# Patient Record
Sex: FEMALE | Race: WHITE | Employment: OTHER | URBAN - METROPOLITAN AREA
[De-identification: names, ages, dates, MRNs, and addresses within clinical notes are randomized per-mention and may not be internally consistent; named-entity substitution may affect disease eponyms.]

---

## 2017-09-25 ENCOUNTER — TELEPHONE (OUTPATIENT)
Dept: ENDOCRINOLOGY | Age: 77
End: 2017-09-25

## 2020-03-09 ENCOUNTER — TELEPHONE (OUTPATIENT)
Dept: INTERNAL MEDICINE CLINIC | Age: 80
End: 2020-03-09

## 2020-03-25 ENCOUNTER — TELEPHONE (OUTPATIENT)
Dept: INTERNAL MEDICINE CLINIC | Age: 80
End: 2020-03-25

## 2020-03-26 ENCOUNTER — OFFICE VISIT (OUTPATIENT)
Dept: INTERNAL MEDICINE CLINIC | Age: 80
End: 2020-03-26
Payer: MEDICARE

## 2020-03-26 ENCOUNTER — TELEPHONE (OUTPATIENT)
Dept: INTERNAL MEDICINE CLINIC | Age: 80
End: 2020-03-26

## 2020-03-26 VITALS
HEART RATE: 65 BPM | WEIGHT: 162 LBS | BODY MASS INDEX: 28.7 KG/M2 | OXYGEN SATURATION: 100 % | SYSTOLIC BLOOD PRESSURE: 137 MMHG | HEIGHT: 63 IN | DIASTOLIC BLOOD PRESSURE: 71 MMHG

## 2020-03-26 PROBLEM — I34.1 MVP (MITRAL VALVE PROLAPSE): Status: ACTIVE | Noted: 2020-03-26

## 2020-03-26 PROBLEM — L71.9 ROSACEA: Status: ACTIVE | Noted: 2020-03-26

## 2020-03-26 PROBLEM — Z86.718 HISTORY OF DEEP VEIN THROMBOSIS OF LOWER EXTREMITY: Status: ACTIVE | Noted: 2017-06-08

## 2020-03-26 PROBLEM — R35.0 INCREASED FREQUENCY OF URINATION: Status: ACTIVE | Noted: 2020-01-10

## 2020-03-26 PROBLEM — N32.81 OAB (OVERACTIVE BLADDER): Status: ACTIVE | Noted: 2019-09-12

## 2020-03-26 PROBLEM — C64.1 CARCINOMA, RENAL CELL, RIGHT (HCC): Status: ACTIVE | Noted: 2020-03-26

## 2020-03-26 PROBLEM — I83.891 VARICOSE VEINS OF LEG WITH SWELLING, RIGHT: Status: ACTIVE | Noted: 2020-03-26

## 2020-03-26 PROBLEM — Z86.39 HISTORY OF THYROID NODULE: Status: ACTIVE | Noted: 2020-03-26

## 2020-03-26 PROBLEM — M81.0 OSTEOPOROSIS: Status: ACTIVE | Noted: 2020-03-26

## 2020-03-26 PROBLEM — N83.202 LEFT OVARIAN CYST: Status: ACTIVE | Noted: 2020-03-26

## 2020-03-26 PROBLEM — R79.89 ELEVATED LFTS: Status: ACTIVE | Noted: 2020-03-26

## 2020-03-26 PROBLEM — G90.529 REFLEX SYMPATHETIC DYSTROPHY OF LOWER LIMB: Status: ACTIVE | Noted: 2017-04-05

## 2020-03-26 PROBLEM — I10 ESSENTIAL HYPERTENSION: Status: ACTIVE | Noted: 2017-02-13

## 2020-03-26 PROBLEM — Z96.652 STATUS POST TOTAL LEFT KNEE REPLACEMENT: Status: ACTIVE | Noted: 2017-03-01

## 2020-03-26 PROCEDURE — 99204 OFFICE O/P NEW MOD 45 MIN: CPT | Performed by: INTERNAL MEDICINE

## 2020-03-26 PROCEDURE — 1123F ACP DISCUSS/DSCN MKR DOCD: CPT | Performed by: INTERNAL MEDICINE

## 2020-03-26 PROCEDURE — G8417 CALC BMI ABV UP PARAM F/U: HCPCS | Performed by: INTERNAL MEDICINE

## 2020-03-26 PROCEDURE — G8399 PT W/DXA RESULTS DOCUMENT: HCPCS | Performed by: INTERNAL MEDICINE

## 2020-03-26 PROCEDURE — 1090F PRES/ABSN URINE INCON ASSESS: CPT | Performed by: INTERNAL MEDICINE

## 2020-03-26 PROCEDURE — 1036F TOBACCO NON-USER: CPT | Performed by: INTERNAL MEDICINE

## 2020-03-26 PROCEDURE — 4040F PNEUMOC VAC/ADMIN/RCVD: CPT | Performed by: INTERNAL MEDICINE

## 2020-03-26 PROCEDURE — G8484 FLU IMMUNIZE NO ADMIN: HCPCS | Performed by: INTERNAL MEDICINE

## 2020-03-26 PROCEDURE — G8427 DOCREV CUR MEDS BY ELIG CLIN: HCPCS | Performed by: INTERNAL MEDICINE

## 2020-03-26 RX ORDER — FOLIC ACID-PYRIDOXINE-CYANOCOBALAMIN TAB 2.5-25-2 MG 2.5-25-2 MG
1 TAB ORAL DAILY
Qty: 90 TABLET | Refills: 1 | Status: SHIPPED | OUTPATIENT
Start: 2020-03-26 | End: 2020-10-05

## 2020-03-26 RX ORDER — ATORVASTATIN CALCIUM 40 MG/1
40 TABLET, FILM COATED ORAL DAILY
Qty: 90 TABLET | Refills: 1 | Status: SHIPPED | OUTPATIENT
Start: 2020-03-26 | End: 2020-09-21

## 2020-03-26 RX ORDER — ATORVASTATIN CALCIUM 40 MG/1
40 TABLET, FILM COATED ORAL DAILY
COMMUNITY
End: 2020-03-26 | Stop reason: SDUPTHER

## 2020-03-26 RX ORDER — HYDROCORTISONE ACETATE 0.5 %
1 CREAM (GRAM) TOPICAL DAILY
COMMUNITY

## 2020-03-26 RX ORDER — LORATADINE 10 MG/1
10 CAPSULE, LIQUID FILLED ORAL DAILY
COMMUNITY
End: 2021-01-26 | Stop reason: ALTCHOICE

## 2020-03-26 RX ORDER — HYDROCHLOROTHIAZIDE 12.5 MG/1
12.5 CAPSULE, GELATIN COATED ORAL DAILY
Qty: 90 CAPSULE | Refills: 1 | Status: CANCELLED | OUTPATIENT
Start: 2020-03-26

## 2020-03-26 RX ORDER — AMLODIPINE BESYLATE 2.5 MG/1
2.5 TABLET ORAL DAILY
COMMUNITY
End: 2020-03-26 | Stop reason: SDUPTHER

## 2020-03-26 RX ORDER — AMLODIPINE BESYLATE 2.5 MG/1
2.5 TABLET ORAL DAILY
Qty: 90 TABLET | Refills: 1 | Status: SHIPPED | OUTPATIENT
Start: 2020-03-26 | End: 2020-09-21

## 2020-03-26 RX ORDER — CHLORAL HYDRATE 500 MG
1000 CAPSULE ORAL DAILY
COMMUNITY
Start: 2004-04-20 | End: 2021-04-26

## 2020-03-26 RX ORDER — FOLIC ACID-PYRIDOXINE-CYANOCOBALAMIN TAB 2.5-25-2 MG 2.5-25-2 MG
1 TAB ORAL DAILY
COMMUNITY
End: 2020-03-26 | Stop reason: SDUPTHER

## 2020-03-26 RX ORDER — HYDROCHLOROTHIAZIDE 12.5 MG/1
12.5 CAPSULE, GELATIN COATED ORAL DAILY
COMMUNITY
End: 2020-06-12

## 2020-03-26 SDOH — HEALTH STABILITY: MENTAL HEALTH: HOW OFTEN DO YOU HAVE A DRINK CONTAINING ALCOHOL?: MONTHLY OR LESS

## 2020-03-26 ASSESSMENT — ENCOUNTER SYMPTOMS
CONSTIPATION: 0
COLOR CHANGE: 0
SINUS PAIN: 0
NAUSEA: 0
SINUS PRESSURE: 0
EYE PAIN: 0
TROUBLE SWALLOWING: 0
WHEEZING: 0
SHORTNESS OF BREATH: 0
DIARRHEA: 0
COUGH: 0
PHOTOPHOBIA: 0
ABDOMINAL PAIN: 0
BACK PAIN: 0
VOMITING: 0

## 2020-03-26 ASSESSMENT — PATIENT HEALTH QUESTIONNAIRE - PHQ9
1. LITTLE INTEREST OR PLEASURE IN DOING THINGS: 0
SUM OF ALL RESPONSES TO PHQ9 QUESTIONS 1 & 2: 0
2. FEELING DOWN, DEPRESSED OR HOPELESS: 0
SUM OF ALL RESPONSES TO PHQ QUESTIONS 1-9: 0
SUM OF ALL RESPONSES TO PHQ QUESTIONS 1-9: 0

## 2020-03-26 NOTE — PROGRESS NOTES
extra 10 lbs over the last year. History of thyroid nodule - had been stable. Never biopsied. Osteopenia  Last DEXA 2016  HISTORY:  Menopause    COMPARISON:  None. REGION ASSESSED:    L Spine (L1-L4):  BMD = 1.068 g/cm2, T-score = 0.2, Z-score = 2.7. Left hip:  BMD = 0.883 g/cm2, T-score = -0.5, Z-score = 1.4. Femoral Neck:  BMD = 0.704 g/cm2, T-score = -1.3, Z-score = 0.8. Right hip:  BMD = 0.884 g/cm2, T-score = -0.5, Z-score = 1.4. Femoral Neck:  BMD = 0.723 g/cm2, T-score = -1.1, Z-score = 1.0. FRAX REPORT (WHO 10 Year Fracture Risk Assesment):    FRAX score is 11% major osteoporotic fracture and 2.1% hip fracture. Review of Systems   Constitutional: Negative for appetite change, fatigue, fever and unexpected weight change. No night sweats   HENT: Negative for congestion, ear pain, hearing loss, nosebleeds, sinus pressure, sinus pain, sneezing, tinnitus and trouble swallowing. Eyes: Negative for photophobia, pain and visual disturbance. Respiratory: Negative for cough, shortness of breath and wheezing. Cardiovascular: Negative for chest pain, palpitations and leg swelling. Gastrointestinal: Negative for abdominal pain, constipation, diarrhea, nausea and vomiting. Endocrine: Negative for cold intolerance, heat intolerance, polydipsia, polyphagia and polyuria. Genitourinary: Negative for difficulty urinating, dysuria, frequency, hematuria and urgency. Musculoskeletal: Negative for arthralgias, back pain, joint swelling, myalgias and neck pain. Skin: Negative for color change and rash. Allergic/Immunologic: Negative for environmental allergies, food allergies and immunocompromised state. Neurological: Negative for dizziness, syncope, speech difficulty, weakness, light-headedness, numbness and headaches. Hematological: Negative for adenopathy. Does not bruise/bleed easily. Psychiatric/Behavioral: Negative for dysphoric mood and sleep disturbance.  The patient is not nervous/anxious. Prior to Visit Medications    Medication Sig Taking? Authorizing Provider   atorvastatin (LIPITOR) 40 MG tablet Take 40 mg by mouth daily Yes Historical Provider, MD   hydrochlorothiazide (MICROZIDE) 12.5 MG capsule Take 12.5 mg by mouth daily Yes Historical Provider, MD   amLODIPine (NORVASC) 2.5 MG tablet Take 2.5 mg by mouth daily Yes Historical Provider, MD   folic acid-pyridoxine-cyancobalamin (FOLBIC) 2.5-25-2 MG TABS Take 1 tablet by mouth daily Yes Historical Provider, MD   Omega-3 Fatty Acids (FISH OIL) 1000 MG CAPS Take 1,000 mg by mouth daily Yes Historical Provider, MD   Glucosamine-Chondroit-Vit C-Mn (GLUCOSAMINE 1500 COMPLEX) CAPS Take 1 tablet by mouth daily Yes Historical Provider, MD   Glucosamine-Chondroit-Vit C-Mn (GLUCOSAMINE 1500 COMPLEX PO) Take 1,500 mg by mouth daily Yes Historical Provider, MD   loratadine (CLARITIN) 10 MG capsule Take 10 mg by mouth daily Yes Historical Provider, MD   aspirin 1 MG/ML SUSP Take 81 mg by mouth daily Yes Historical Provider, MD        Allergies   Allergen Reactions    Atenolol      Avoid beta blockers; sino atrial node dysfunction Dr Garland Pena 12/2013    Sulfa Antibiotics Nausea And Vomiting     Dizziness    Lisinopril Swelling    Meperidine Nausea And Vomiting     Lightheadness       Past Medical History:   Diagnosis Date    DVT (deep venous thrombosis) ([facility] [address] 2017    after left TKR       No past surgical history on file.     Social History     Socioeconomic History    Marital status:      Spouse name: Not on file    Number of children: Not on file    Years of education: Not on file    Highest education level: Not on file   Occupational History    Not on file   Social Needs    Financial resource strain: Not on file    Food insecurity     Worry: Not on file     Inability: Not on file    Transportation needs     Medical: Not on file     Non-medical: Not on file   Tobacco Use    Smoking status: Former Smoker Years: 15.00     Types: Cigarettes    Smokeless tobacco: Former User    Tobacco comment: stop smoking 1980   Substance and Sexual Activity    Alcohol use: Yes     Alcohol/week: 1.0 standard drinks     Types: 1 Glasses of wine per week     Frequency: Monthly or less    Drug use: Never    Sexual activity: Not Currently   Lifestyle    Physical activity     Days per week: Not on file     Minutes per session: Not on file    Stress: Not on file   Relationships    Social connections     Talks on phone: Not on file     Gets together: Not on file     Attends Bahai service: Not on file     Active member of club or organization: Not on file     Attends meetings of clubs or organizations: Not on file     Relationship status: Not on file    Intimate partner violence     Fear of current or ex partner: Not on file     Emotionally abused: Not on file     Physically abused: Not on file     Forced sexual activity: Not on file   Other Topics Concern    Not on file   Social History Narrative    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 Family History   Problem Relation Age of Onset    Breast Cancer Daughter 44       Vitals:    03/26/20 1023   BP: 137/71   Pulse: 65   SpO2: 100%   Weight: 162 lb (73.5 kg)   Height: 5' 3\" (1.6 m)     Estimated body mass index is 28.7 kg/m² as calculated from the following:    Height as of this encounter: 5' 3\" (1.6 m). Weight as of this encounter: 162 lb (73.5 kg). Physical Exam  Constitutional:       General: She is not in acute distress. Appearance: Normal appearance. She is well-developed. She is not diaphoretic. HENT:      Head: Normocephalic and atraumatic. Right Ear: Tympanic membrane, ear canal and external ear normal.      Left Ear: Tympanic membrane, ear canal and external ear normal.      Nose: Nose normal.      Mouth/Throat:      Mouth: Mucous membranes are moist.      Pharynx: Oropharynx is clear.  No oropharyngeal exudate or posterior oropharyngeal Differential; Future    Left ovarian cyst  As far as I know this is new. Relatively small, cystic. Will get pelvic u/s (has been 2 months) and . May need gyn referral.   -     CBC Auto Differential; Future  -     ; Future  -     US PELVIS COMPLETE; Future    Carcinoma, renal cell, right (HCC)  -     CBC Auto Differential; Future    History of thyroid nodule  Will check TSH    Encounter for screening mammogram for breast cancer  -     CEE DIGITAL SCREEN W OR WO CAD BILATERAL; Future    Postmenopausal  -     DEXA BONE DENSITY AXIAL SKELETON; Future  -     Vitamin D 25 Hydroxy; Future    Osteopenia of multiple sites  -     DEXA BONE DENSITY AXIAL SKELETON; Future  -     Vitamin D 25 Hydroxy; Future    Varicose veins of leg with swelling, right  Stable    Rosacea  Stable follows with derm    Vitamin D deficiency  -     Vitamin D 25 Hydroxy; Future    Mixed hyperlipidemia  Due for assessment. Lifestyle modifications. Atorvastatin 40mg daily. -     atorvastatin (LIPITOR) 40 MG tablet; Take 1 tablet by mouth daily  -     Comprehensive Metabolic Panel; Future  -     Lipid Panel; Future  -     CBC Auto Differential; Future    Thyroid nodule  -     TSH with Reflex; Future    Other intra-abdominal and pelvic swelling, mass and lump   -     ; Future    Other orders  -     folic acid-pyridoxine-cyancobalamin (FOLBIC) 2.5-25-2 MG TABS; Take 1 tablet by mouth daily          Return in about 4 months (around 7/26/2020) for chronic conditions. An  electronic signature was used to authenticate this note.     --Gladys Randle MD on 3/26/2020 at 11:04 AM

## 2020-03-27 ENCOUNTER — HOSPITAL ENCOUNTER (OUTPATIENT)
Dept: ULTRASOUND IMAGING | Age: 80
Discharge: HOME | End: 2020-03-27
Payer: MEDICARE

## 2020-03-27 PROCEDURE — 76830 TRANSVAGINAL US NON-OB: CPT

## 2020-03-27 PROCEDURE — 76856 US EXAM PELVIC COMPLETE: CPT

## 2020-07-27 ENCOUNTER — OFFICE VISIT (OUTPATIENT)
Dept: INTERNAL MEDICINE CLINIC | Age: 80
End: 2020-07-27
Payer: MEDICARE

## 2020-07-27 VITALS
DIASTOLIC BLOOD PRESSURE: 62 MMHG | OXYGEN SATURATION: 99 % | TEMPERATURE: 99.2 F | HEART RATE: 60 BPM | RESPIRATION RATE: 14 BRPM | SYSTOLIC BLOOD PRESSURE: 138 MMHG

## 2020-07-27 PROCEDURE — G8399 PT W/DXA RESULTS DOCUMENT: HCPCS | Performed by: INTERNAL MEDICINE

## 2020-07-27 PROCEDURE — 4040F PNEUMOC VAC/ADMIN/RCVD: CPT | Performed by: INTERNAL MEDICINE

## 2020-07-27 PROCEDURE — 99214 OFFICE O/P EST MOD 30 MIN: CPT | Performed by: INTERNAL MEDICINE

## 2020-07-27 PROCEDURE — 1123F ACP DISCUSS/DSCN MKR DOCD: CPT | Performed by: INTERNAL MEDICINE

## 2020-07-27 PROCEDURE — 1036F TOBACCO NON-USER: CPT | Performed by: INTERNAL MEDICINE

## 2020-07-27 PROCEDURE — 1090F PRES/ABSN URINE INCON ASSESS: CPT | Performed by: INTERNAL MEDICINE

## 2020-07-27 PROCEDURE — G8417 CALC BMI ABV UP PARAM F/U: HCPCS | Performed by: INTERNAL MEDICINE

## 2020-07-27 PROCEDURE — G8427 DOCREV CUR MEDS BY ELIG CLIN: HCPCS | Performed by: INTERNAL MEDICINE

## 2020-07-27 NOTE — PROGRESS NOTES
[address]  Primary Care  Internal Medicine  Follow Up  Megan Salas MD      SUBJECTIVE:  Marisol Ocampo is a 78 y. o.female    Chief Complaint   Patient presents with    Hyperlipidemia     Here for routine follow up. Had her 6 month follow up for right RCC at [address] [place] [address] and disease was stable. 7/2020 CT a/p with contrast  IMPRESSION:    1. Enhancing lesion inferior pole right kidney present previously most consistent with renal cell carcinoma without invasion of the structures of the renal sinus or adjacent structures. Hyperdense lesion inferior pole left kidney without significant enhancement likely relates to hyperdense cyst. Continued follow-up recommended. Tiny retroperitoneal lymph nodes by size criteria likely reactive. Small hiatal hernia. There were previously noted left ovarian cysts which were not commented on in this study. Seen on ultrasound in March and 6 month follow up recommended. 3/27/20 pelvic u/s  Pelvic ultrasound         Transabdominal and transvaginal exam         HISTORY: Left ovarian cyst.         COMPARISON: Pelvic ultrasound December 27, 2012.         Uterus has been removed.         The right ovary measures 21 x 11 x 15 mm. A suspected simple cyst is identified in the right ovary which measures 11 x 10 x 9 mm.         Within the left adnexa there are 2 adjacent cysts without definite ovarian tissue measuring 19 x 18 x 20 mm with possible thin septation versus adjacent separate cyst. A second simple cyst is identified which measures 28 x 23 x 24 mm. Findings are    indeterminate and follow-up in 6 months is recommended. HTN - tolerating amlodipine 2.5mg daily and HCTZ 12.5mg daily without issue. Trying to stay active. HLD - taking atorvastatin 40mg daily without new myalgias or GI side effects. Past Medical History:   Diagnosis Date    DVT (deep venous thrombosis) ([address] 2017    after left TKR       History reviewed.  No pertinent surgical history. Family History   Problem Relation Age of Onset    Deep Vein Thrombosis Mother 26    Breast Cancer Daughter 44       Social History     Socioeconomic History    Marital status:      Spouse name: Ivanna Valdez Number of children: Not on file    Years of education: Not on file    Highest education level: Not on file   Occupational History    Not on file   Social Needs    Financial resource strain: Not on file    Food insecurity     Worry: Not on file     Inability: Not on file   [employer] needs     Medical: Not on file     Non-medical: Not on file   Tobacco Use    Smoking status: Former Smoker     Years: 15.00     Types: Cigarettes     Last attempt to quit: 1980     Years since quittin.6    Smokeless tobacco: Former User    Tobacco comment: stop smoking    Substance and Sexual Activity    Alcohol use: Yes     Alcohol/week: 1.0 standard drinks     Types: 1 Glasses of wine per week     Frequency: Monthly or less    Drug use: Never    Sexual activity: Not Currently   Lifestyle    Physical activity     Days per week: Not on file     Minutes per session: Not on file    Stress: Not on file   Relationships    Social connections     Talks on phone: Not on file     Gets together: Not on file     Attends Buddhism service: Not on file     Active member of club or organization: Not on file     Attends meetings of clubs or organizations: Not on file     Relationship status: Not on file    Intimate partner violence     Fear of current or ex partner: Not on file     Emotionally abused: Not on file     Physically abused: Not on file     Forced sexual activity: Not on file   Other Topics Concern    Not on file   Social History Narrative    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 Multiple grandchildren. Review of Systems   Constitutional: Negative for chills, fatigue and fever.    Respiratory: Negative for cough, shortness of breath and wheezing. Cardiovascular: Negative for chest pain, palpitations and leg swelling. Gastrointestinal: Negative for abdominal distention, abdominal pain, constipation, diarrhea, nausea and vomiting. Genitourinary: Negative for difficulty urinating, frequency and urgency. Musculoskeletal: Negative for arthralgias. Skin: Negative for rash. Current Outpatient Medications on File Prior to Visit   Medication Sig Dispense Refill    Loratadine (CLARITIN PO) Take 10 mg by mouth as needed      hydroCHLOROthiazide (MICROZIDE) 12.5 MG capsule TAKE 1 CAPSULE BY MOUTH DAILY 90 capsule 1    folic acid-pyridoxine-cyancobalamin (FOLBIC) 2.5-25-2 MG TABS Take 1 tablet by mouth daily 90 tablet 1    atorvastatin (LIPITOR) 40 MG tablet Take 1 tablet by mouth daily 90 tablet 1    amLODIPine (NORVASC) 2.5 MG tablet Take 1 tablet by mouth daily 90 tablet 1    Omega-3 Fatty Acids (FISH OIL) 1000 MG CAPS Take 1,000 mg by mouth daily      Glucosamine-Chondroit-Vit C-Mn (GLUCOSAMINE 1500 COMPLEX) CAPS Take 1 tablet by mouth daily      aspirin 1 MG/ML SUSP Take 81 mg by mouth daily      loratadine (CLARITIN) 10 MG capsule Take 10 mg by mouth daily       No current facility-administered medications on file prior to visit. OBJECTIVE:  /62   Pulse 60   Temp 99.2 °F (37.3 °C) (Temporal)   Resp 14   SpO2 99%   Wt Readings from Last 3 Encounters:   03/26/20 162 lb (73.5 kg)     There is no height or weight on file to calculate BMI. BP Readings from Last 3 Encounters:   07/27/20 138/62   03/26/20 137/71     Pulse Readings from Last 3 Encounters:   07/27/20 60   03/26/20 65       Physical Exam  Constitutional:       General: She is not in acute distress. Appearance: Normal appearance. She is not diaphoretic. HENT:      Head: Normocephalic and atraumatic.       Right Ear: External ear normal.      Left Ear: External ear normal.      Nose: Nose normal.      Mouth/Throat:      Mouth: Mucous membranes are moist.      Pharynx: Oropharynx is clear. Eyes:      General: No scleral icterus. Conjunctiva/sclera: Conjunctivae normal.   Neck:      Musculoskeletal: Normal range of motion. Cardiovascular:      Rate and Rhythm: Normal rate and regular rhythm. Pulses: Normal pulses. Heart sounds: Normal heart sounds. No murmur. No friction rub. No gallop. Pulmonary:      Effort: Pulmonary effort is normal.      Breath sounds: Normal breath sounds. No wheezing, rhonchi or rales. Abdominal:      General: Abdomen is flat. Bowel sounds are normal.   Musculoskeletal:         General: No deformity. Right lower leg: No edema. Left lower leg: No edema. Skin:     General: Skin is warm and dry. Findings: No rash. Neurological:      General: No focal deficit present. Mental Status: She is alert. Coordination: Coordination normal.      Gait: Gait normal.   Psychiatric:         Mood and Affect: Mood normal.         Behavior: Behavior normal.           Available labs reviewed    ASSESSMENT/PLAN:    Abel Stafford was seen today for hyperlipidemia. Diagnoses and all orders for this visit:    Left ovarian cyst  Need to document stability. left adnexa there are 2 adjacent cysts without definite ovarian tissue measuring 19 x 18 x 20 mm with possible thin septation versus adjacent separate cyst. A second simple cyst is identified which measures 28 x 23 x 24 mm. Will get repeat pelvic u/s.   -     US NON OB TRANSVAGINAL; Future    Carcinoma, renal cell, right (HCC)  Stable. Following at [facility] Essential hypertension  BP at goal today. No medication changes. She would like it to be lower. Discussed sodium restriction and increasing activity levels. Consider increasing HCTZ dose in future if needed. Mixed hyperlipidemia  Not due for assessment. Continue atorvastatin 40mg daily. Return in about 6 months (around 1/27/2021) for AWV + chronic.

## 2020-08-05 ASSESSMENT — ENCOUNTER SYMPTOMS
VOMITING: 0
DIARRHEA: 0
SHORTNESS OF BREATH: 0
COUGH: 0
NAUSEA: 0
ABDOMINAL PAIN: 0
ABDOMINAL DISTENTION: 0
CONSTIPATION: 0
WHEEZING: 0

## 2020-09-21 RX ORDER — ATORVASTATIN CALCIUM 40 MG/1
40 TABLET, FILM COATED ORAL DAILY
Qty: 90 TABLET | Refills: 1 | Status: SHIPPED | OUTPATIENT
Start: 2020-09-21 | End: 2021-03-15

## 2020-09-21 RX ORDER — AMLODIPINE BESYLATE 2.5 MG/1
2.5 TABLET ORAL DAILY
Qty: 90 TABLET | Refills: 1 | Status: SHIPPED | OUTPATIENT
Start: 2020-09-21 | End: 2021-03-02

## 2020-10-07 ENCOUNTER — HOSPITAL ENCOUNTER (OUTPATIENT)
Dept: ULTRASOUND IMAGING | Age: 80
Discharge: HOME | End: 2020-10-07
Payer: MEDICARE

## 2020-10-07 PROCEDURE — 76830 TRANSVAGINAL US NON-OB: CPT

## 2020-10-07 PROCEDURE — 76856 US EXAM PELVIC COMPLETE: CPT

## 2020-12-17 RX ORDER — HYDROCHLOROTHIAZIDE 12.5 MG/1
12.5 CAPSULE, GELATIN COATED ORAL EVERY MORNING
Qty: 90 CAPSULE | Refills: 1 | Status: SHIPPED | OUTPATIENT
Start: 2020-12-17 | End: 2020-12-28 | Stop reason: SDUPTHER

## 2020-12-28 RX ORDER — HYDROCHLOROTHIAZIDE 12.5 MG/1
12.5 CAPSULE, GELATIN COATED ORAL EVERY MORNING
Qty: 90 CAPSULE | Refills: 1 | Status: SHIPPED | OUTPATIENT
Start: 2020-12-28 | End: 2021-04-26 | Stop reason: SINTOL

## 2020-12-28 NOTE — TELEPHONE ENCOUNTER
On 12/17/2020, refill went to [facility]. Needed to be [facility]. Patient out of meds.      Rx sent to correct pharmacy

## 2021-01-26 ENCOUNTER — TELEPHONE (OUTPATIENT)
Dept: VASCULAR SURGERY | Age: 81
End: 2021-01-26

## 2021-01-26 ENCOUNTER — OFFICE VISIT (OUTPATIENT)
Dept: INTERNAL MEDICINE CLINIC | Age: 81
End: 2021-01-26
Payer: MEDICARE

## 2021-01-26 VITALS
HEIGHT: 63 IN | WEIGHT: 158.6 LBS | RESPIRATION RATE: 12 BRPM | DIASTOLIC BLOOD PRESSURE: 68 MMHG | OXYGEN SATURATION: 98 % | TEMPERATURE: 97.6 F | HEART RATE: 68 BPM | SYSTOLIC BLOOD PRESSURE: 160 MMHG | BODY MASS INDEX: 28.1 KG/M2

## 2021-01-26 DIAGNOSIS — C64.1 CARCINOMA, RENAL CELL, RIGHT (HCC): ICD-10-CM

## 2021-01-26 DIAGNOSIS — I83.813 VARICOSE VEINS OF BILATERAL LOWER EXTREMITIES WITH PAIN: ICD-10-CM

## 2021-01-26 DIAGNOSIS — I10 ESSENTIAL HYPERTENSION: ICD-10-CM

## 2021-01-26 DIAGNOSIS — E78.2 MIXED HYPERLIPIDEMIA: ICD-10-CM

## 2021-01-26 DIAGNOSIS — E55.9 VITAMIN D DEFICIENCY: ICD-10-CM

## 2021-01-26 DIAGNOSIS — Z00.00 ROUTINE GENERAL MEDICAL EXAMINATION AT A HEALTH CARE FACILITY: Primary | ICD-10-CM

## 2021-01-26 DIAGNOSIS — I35.8 AORTIC VALVE SCLEROSIS: ICD-10-CM

## 2021-01-26 DIAGNOSIS — I34.0 NON-RHEUMATIC MITRAL REGURGITATION: ICD-10-CM

## 2021-01-26 DIAGNOSIS — Z13.6 ENCOUNTER FOR SCREENING FOR CARDIOVASCULAR DISORDERS: ICD-10-CM

## 2021-01-26 PROCEDURE — 93000 ELECTROCARDIOGRAM COMPLETE: CPT | Performed by: INTERNAL MEDICINE

## 2021-01-26 PROCEDURE — G8399 PT W/DXA RESULTS DOCUMENT: HCPCS | Performed by: INTERNAL MEDICINE

## 2021-01-26 PROCEDURE — 1036F TOBACCO NON-USER: CPT | Performed by: INTERNAL MEDICINE

## 2021-01-26 PROCEDURE — G8417 CALC BMI ABV UP PARAM F/U: HCPCS | Performed by: INTERNAL MEDICINE

## 2021-01-26 PROCEDURE — 1123F ACP DISCUSS/DSCN MKR DOCD: CPT | Performed by: INTERNAL MEDICINE

## 2021-01-26 PROCEDURE — 99214 OFFICE O/P EST MOD 30 MIN: CPT | Performed by: INTERNAL MEDICINE

## 2021-01-26 PROCEDURE — 4040F PNEUMOC VAC/ADMIN/RCVD: CPT | Performed by: INTERNAL MEDICINE

## 2021-01-26 PROCEDURE — 1090F PRES/ABSN URINE INCON ASSESS: CPT | Performed by: INTERNAL MEDICINE

## 2021-01-26 PROCEDURE — G0438 PPPS, INITIAL VISIT: HCPCS | Performed by: INTERNAL MEDICINE

## 2021-01-26 PROCEDURE — G8427 DOCREV CUR MEDS BY ELIG CLIN: HCPCS | Performed by: INTERNAL MEDICINE

## 2021-01-26 PROCEDURE — G8482 FLU IMMUNIZE ORDER/ADMIN: HCPCS | Performed by: INTERNAL MEDICINE

## 2021-01-26 ASSESSMENT — PATIENT HEALTH QUESTIONNAIRE - PHQ9
SUM OF ALL RESPONSES TO PHQ9 QUESTIONS 1 & 2: 0
SUM OF ALL RESPONSES TO PHQ QUESTIONS 1-9: 0
SUM OF ALL RESPONSES TO PHQ QUESTIONS 1-9: 0

## 2021-01-26 ASSESSMENT — LIFESTYLE VARIABLES
HOW OFTEN DO YOU HAVE SIX OR MORE DRINKS ON ONE OCCASION: 0
AUDIT-C TOTAL SCORE: 2

## 2021-01-26 NOTE — PROGRESS NOTES
Medicare Annual Wellness Visit  Name: Atiya Lee Date: 2021   MRN: [id number] Sex: Female   Age: [de-identified] y.o. Ethnicity: Non-/Non    : 1940 Race: Dina Banks is here for Medicare AWV    Also wants a cardiac work up. Going to [place] over the summer on an active trip with her 13year old grandson and has fear   She is taking her ASA/statin. BP has not been well controlled - variable at home. Does check. Occasionally sees 346 systolic but usually lower. Not as active as she had been. Has some varicose veins that bother her right more than left. Has had knee surgery on the left with history of dvt after surgery. They are achy after activity. Would liek to see vascular. Screenings for behavioral, psychosocial and functional/safety risks, and cognitive dysfunction are all negative except as indicated below. These results, as well as other patient data from the [address] form, are documented in Flowsheets linked to this Encounter. Allergies   Allergen Reactions    Atenolol      Avoid beta blockers; sino atrial node dysfunction Dr Alonso Gottron 2013    Sulfa Antibiotics Nausea And Vomiting     Dizziness    Lisinopril Swelling    Meperidine Nausea And Vomiting     Lightheadness       Prior to Visit Medications    Medication Sig Taking?  Authorizing Provider   hydroCHLOROthiazide (MICROZIDE) 12.5 MG capsule Take 1 capsule by mouth every morning Yes MD VIDAL DixonBIC 2.5-25-2 MG TABS TAKE ONE TABLET BY MOUTH DAILY Yes Mich Briseno MD   atorvastatin (LIPITOR) 40 MG tablet TAKE 1 TABLET BY MOUTH DAILY Yes Mich Briseno MD   amLODIPine (NORVASC) 2.5 MG tablet TAKE 1 TABLET BY MOUTH DAILY Yes Mich Briseno MD   Loratadine (CLARITIN PO) Take 10 mg by mouth as needed Yes Historical Provider, MD   Omega-3 Fatty Acids (FISH OIL) 1000 MG CAPS Take 1,000 mg by mouth daily Yes Historical Provider, MD   Glucosamine-Chondroit-Vit C-Mn (GLUCOSAMINE 1500 COMPLEX) CAPS Take 1 tablet by mouth daily Yes Historical Provider, MD   aspirin 1 MG/ML SUSP Take 81 mg by mouth daily Yes Historical Provider, MD       Past Medical History:   Diagnosis Date    DVT (deep venous thrombosis) ([address] 2017    after left TKR       History reviewed. No pertinent surgical history. Family History   Problem Relation Age of Onset    Deep Vein Thrombosis Mother 35    Breast Cancer Daughter 44       CareTeam (Including outside providers/suppliers regularly involved in providing care):   Patient Care Team:  Mayuri Lion MD as PCP - General (Internal Medicine)  Mayuri Lion MD as PCP - [facility] Empaneled Provider  Shalonda Callaway MD as Consulting Physician (Urology)    Wt Readings from Last 3 Encounters:   01/26/21 158 lb 9.6 oz (71.9 kg)   03/26/20 162 lb (73.5 kg)     Vitals:    01/26/21 1007   BP: (!) 160/68   Pulse: 68   Resp: 12   Temp: 97.6 °F (36.4 °C)   TempSrc: Temporal   SpO2: 98%   Weight: 158 lb 9.6 oz (71.9 kg)   Height: 5' 3\" (1.6 m)     Body mass index is 28.09 kg/m². Based upon direct observation of the patient, evaluation of cognition reveals recent and remote memory intact. Physical Exam  Constitutional:       General: She is not in acute distress. Appearance: Normal appearance. She is not diaphoretic. HENT:      Head: Normocephalic and atraumatic. Right Ear: External ear normal.      Left Ear: External ear normal.      Nose: Nose normal.      Mouth/Throat:      Pharynx: Oropharynx is clear. Eyes:      General: No scleral icterus. Conjunctiva/sclera: Conjunctivae normal.   Neck:      Musculoskeletal: Normal range of motion. Cardiovascular:      Rate and Rhythm: Normal rate and regular rhythm. Pulses: Normal pulses. Heart sounds: Normal heart sounds. No murmur. No friction rub. No gallop. Comments: Varicose veins right greater than left.  No ulceration or inflammation noted  Pulmonary:      Effort: Pulmonary effort is normal.      Breath sounds: Normal breath sounds. No wheezing, rhonchi or rales. Abdominal:      General: Abdomen is flat. Bowel sounds are normal.   Musculoskeletal:         General: No deformity. Right lower leg: No edema. Left lower leg: No edema. Skin:     General: Skin is warm and dry. Findings: No rash. Neurological:      General: No focal deficit present. Mental Status: She is alert. Coordination: Coordination normal.      Gait: Gait normal.   Psychiatric:         Mood and Affect: Mood normal.         Behavior: Behavior normal.           Patient's complete Health Risk Assessment and screening values have been reviewed and are found in Flowsheets. The following problems were reviewed today and where indicated follow up appointments were made and/or referrals ordered. Positive Risk Factor Screenings with Interventions:               No Positive Risk Factors identified today.     Personalized Preventive Plan   Current Health Maintenance Status  Immunization History   Administered Date(s) Administered    COVID-19, Moderna, 100mcg/0.5ml 01/19/2021    Hepatitis A Ped/Adol (Havrix, Vaqta) 11/20/2009, 05/25/2010    Influenza Virus Vaccine 10/01/2019    Influenza, High Dose (Fluzone 65 yrs and older) 09/08/2020    Pneumococcal Conjugate 13-valent ([id number]) 11/23/2014    Pneumococcal Polysaccharide ([id number]) 02/01/2005, 12/19/2012    Td (Adult), 5 Lf Tetanus Toxoid, Pf (Tenivac, Decavac) 11/01/2009    Tdap (Boostrix, Adacel) 07/31/2020    Typhoid Vi capsular polysaccharide (Typhim VI) 11/02/2009    Yellow Fever (YF-Vax) 11/20/2009    Zoster Live (Zostavax) 04/01/2007    Zoster Recombinant (Shingrix) 08/01/2018, 10/03/2018, 10/24/2018        Health Maintenance   Topic Date Due    Annual Wellness Visit (AWV)  03/13/2020    COVID-19 Vaccine (2 of 2 - Moderna series) 02/16/2021    Lipid screen  03/26/2021    Potassium monitoring  03/26/2021    Creatinine monitoring  03/26/2021    DTaP/Tdap/Td vaccine (2 - Td) 07/31/2030    DEXA (modify frequency per FRAX score)  Completed    Flu vaccine  Completed    Shingles Vaccine  Completed    Pneumococcal 65+ years Vaccine  Completed    Hepatitis A vaccine  Aged Out    Hepatitis B vaccine  Aged Out    Hib vaccine  Aged Out    Meningococcal (ACWY) vaccine  Aged Out     Recommendations for [employer] Due: see orders and patient instructions/AVS.  . Recommended screening schedule for the next 5-10 years is provided to the patient in written form: see Patient Castro Gross was seen today for medicare awv. Diagnoses and all orders for this visit:    Routine general medical examination at a health care facility  Personalized Preventive Plan is provided to patient in written form- see Patient Instructions    Aortic valve sclerosis  -     ECHO Complete 2D W Doppler W Color; Future  -     EKG 12 Lead    Non-rheumatic mitral regurgitation  Monitor her MR with TTE. Has been 3 years. No progression of symptoms. EKG today unchanged from prior. TWI I, aVL unchanged from prior. Had ETT in 2019 which was normal.   -     ECHO Complete 2D W Doppler W Color; Future  -     EKG 12 Lead    Essential hypertension  Monitor BP at home over next few weeks. Consider increase amlodipine to 5mg daily. Check labs. Encourage lifestyle modifications. -     EKG 12 Lead  -     Comprehensive Metabolic Panel; Future  -     CBC Auto Differential; Future  -     TSH with Reflex; Future  -     C-REACTIVE PROTEIN HIGH SENSITIVITY; Future    Vitamin D deficiency  - Vitamin D levels. Mixed hyperlipidemia  Continue statin. Due for assessment. Encouraged lifestyle modifications. -     Lipid Panel; Future  -     Comprehensive Metabolic Panel; Future  -     CBC Auto Differential; Future  -     C-REACTIVE PROTEIN HIGH SENSITIVITY;  Future    Encounter for screening for cardiovascular disorders  -     C-REACTIVE PROTEIN HIGH SENSITIVITY; Future    Varicose veins of bilateral lower extremities with pain  -     Naima Magdaleno MD, Vascular Surgery, [facility]         RCC right - CT due this summer for monitoring at [facility].

## 2021-01-26 NOTE — PATIENT INSTRUCTIONS
Personalized Preventive Plan for Shira Mcnamara - 1/26/2021  Medicare offers a range of preventive health benefits. Some of the tests and screenings are paid in full while other may be subject to a deductible, co-insurance, and/or copay. Some of these benefits include a comprehensive review of your medical history including lifestyle, illnesses that may run in your family, and various assessments and screenings as appropriate. After reviewing your medical record and screening and assessments performed today your provider may have ordered immunizations, labs, imaging, and/or referrals for you. A list of these orders (if applicable) as well as your Preventive Care list are included within your After Visit Summary for your review. Other Preventive Recommendations:    · A preventive eye exam performed by an eye specialist is recommended every 1-2 years to screen for glaucoma; cataracts, macular degeneration, and other eye disorders. · A preventive dental visit is recommended every 6 months. · Try to get at least 150 minutes of exercise per week or 10,000 steps per day on a pedometer . · Order or download the FREE \"Exercise & Physical Activity: Your Everyday Guide\" from [employer]. Call 0-645.323.6221 or search [employer] online. · You need 9956-0174 mg of calcium and 4553-0264 IU of vitamin D per day. It is possible to meet your calcium requirement with diet alone, but a vitamin D supplement is usually necessary to meet this goal.  · When exposed to the sun, use a sunscreen that protects against both UVA and UVB radiation with an SPF of 30 or greater. Reapply every 2 to 3 hours or after sweating, drying off with a towel, or swimming. · Always wear a seat belt when traveling in a car. Always wear a helmet when riding a bicycle or motorcycle.

## 2021-01-26 NOTE — TELEPHONE ENCOUNTER
New patient scheduled for 2/9/21    Patient is being seen for I83.813 (ICD-10-CM) - Varicose veins of bilateral lower extremities with pain

## 2021-01-27 DIAGNOSIS — I83.819 VARICOSE VEINS WITH PAIN: Primary | ICD-10-CM

## 2021-01-29 ENCOUNTER — HOSPITAL ENCOUNTER (OUTPATIENT)
Dept: NON INVASIVE DIAGNOSTICS | Age: 81
Discharge: HOME | End: 2021-01-29
Payer: MEDICARE

## 2021-01-29 ENCOUNTER — HOSPITAL ENCOUNTER (OUTPATIENT)
Dept: VASCULAR LAB | Age: 81
Discharge: HOME | End: 2021-01-29
Payer: MEDICARE

## 2021-01-29 DIAGNOSIS — I35.8 AORTIC VALVE SCLEROSIS: ICD-10-CM

## 2021-01-29 DIAGNOSIS — I34.0 NON-RHEUMATIC MITRAL REGURGITATION: ICD-10-CM

## 2021-01-29 DIAGNOSIS — E78.2 MIXED HYPERLIPIDEMIA: ICD-10-CM

## 2021-01-29 DIAGNOSIS — I83.819 VARICOSE VEINS WITH PAIN: ICD-10-CM

## 2021-01-29 DIAGNOSIS — I10 ESSENTIAL HYPERTENSION: ICD-10-CM

## 2021-01-29 DIAGNOSIS — Z13.6 ENCOUNTER FOR SCREENING FOR CARDIOVASCULAR DISORDERS: ICD-10-CM

## 2021-01-29 DIAGNOSIS — E55.9 VITAMIN D DEFICIENCY: ICD-10-CM

## 2021-01-29 LAB
A/G RATIO: 1.7 (ref 1.1–2.2)
ALBUMIN SERPL-MCNC: 4.3 G/DL (ref 3.4–5)
ALP BLD-CCNC: 71 U/L (ref 40–129)
ALT SERPL-CCNC: 24 U/L (ref 10–40)
ANION GAP SERPL CALCULATED.3IONS-SCNC: 9 MMOL/L (ref 3–16)
AST SERPL-CCNC: 28 U/L (ref 15–37)
BASOPHILS ABSOLUTE: 0.1 K/UL (ref 0–0.2)
BASOPHILS RELATIVE PERCENT: 0.7 %
BILIRUB SERPL-MCNC: 0.7 MG/DL (ref 0–1)
BUN BLDV-MCNC: 19 MG/DL (ref 7–20)
C-REACTIVE PROTEIN, HIGH SENSITIVITY: 0.88 MG/L (ref 0.16–3)
CALCIUM SERPL-MCNC: 9.1 MG/DL (ref 8.3–10.6)
CHLORIDE BLD-SCNC: 104 MMOL/L (ref 99–110)
CHOLESTEROL, TOTAL: 144 MG/DL (ref 0–199)
CO2: 27 MMOL/L (ref 21–32)
CREAT SERPL-MCNC: 0.6 MG/DL (ref 0.6–1.2)
EOSINOPHILS ABSOLUTE: 0.1 K/UL (ref 0–0.6)
EOSINOPHILS RELATIVE PERCENT: 0.9 %
GFR AFRICAN AMERICAN: >60
GFR NON-AFRICAN AMERICAN: >60
GLOBULIN: 2.5 G/DL
GLUCOSE BLD-MCNC: 97 MG/DL (ref 70–99)
HCT VFR BLD CALC: 43.3 % (ref 36–48)
HDLC SERPL-MCNC: 58 MG/DL (ref 40–60)
HEMOGLOBIN: 14.1 G/DL (ref 12–16)
LDL CHOLESTEROL CALCULATED: 71 MG/DL
LV EF: 63 %
LVEF MODALITY: NORMAL
LYMPHOCYTES ABSOLUTE: 2.1 K/UL (ref 1–5.1)
LYMPHOCYTES RELATIVE PERCENT: 26.4 %
MCH RBC QN AUTO: 29.1 PG (ref 26–34)
MCHC RBC AUTO-ENTMCNC: 32.6 G/DL (ref 31–36)
MCV RBC AUTO: 89.2 FL (ref 80–100)
MONOCYTES ABSOLUTE: 0.6 K/UL (ref 0–1.3)
MONOCYTES RELATIVE PERCENT: 7.8 %
NEUTROPHILS ABSOLUTE: 5 K/UL (ref 1.7–7.7)
NEUTROPHILS RELATIVE PERCENT: 64.2 %
PDW BLD-RTO: 13.6 % (ref 12.4–15.4)
PLATELET # BLD: 271 K/UL (ref 135–450)
PMV BLD AUTO: 8.8 FL (ref 5–10.5)
POTASSIUM SERPL-SCNC: 3.8 MMOL/L (ref 3.5–5.1)
RBC # BLD: 4.85 M/UL (ref 4–5.2)
SODIUM BLD-SCNC: 140 MMOL/L (ref 136–145)
TOTAL PROTEIN: 6.8 G/DL (ref 6.4–8.2)
TRIGL SERPL-MCNC: 75 MG/DL (ref 0–150)
TSH REFLEX: 2.2 UIU/ML (ref 0.27–4.2)
VITAMIN D 25-HYDROXY: 45.9 NG/ML
VLDLC SERPL CALC-MCNC: 15 MG/DL
WBC # BLD: 7.8 K/UL (ref 4–11)

## 2021-01-29 PROCEDURE — 6360000004 HC RX CONTRAST MEDICATION: Performed by: INTERNAL MEDICINE

## 2021-01-29 PROCEDURE — 93970 EXTREMITY STUDY: CPT

## 2021-01-29 PROCEDURE — C8929 TTE W OR WO FOL WCON,DOPPLER: HCPCS

## 2021-01-29 RX ADMIN — PERFLUTREN 2.2 MG: 6.52 INJECTION, SUSPENSION INTRAVENOUS at 15:31

## 2021-02-23 ENCOUNTER — OFFICE VISIT (OUTPATIENT)
Dept: VASCULAR SURGERY | Age: 81
End: 2021-02-23
Payer: MEDICARE

## 2021-02-23 VITALS
BODY MASS INDEX: 27.64 KG/M2 | HEIGHT: 63 IN | HEART RATE: 70 BPM | TEMPERATURE: 97.3 F | SYSTOLIC BLOOD PRESSURE: 138 MMHG | DIASTOLIC BLOOD PRESSURE: 74 MMHG | WEIGHT: 156 LBS

## 2021-02-23 DIAGNOSIS — M79.671 RIGHT FOOT PAIN: Primary | ICD-10-CM

## 2021-02-23 PROCEDURE — G8399 PT W/DXA RESULTS DOCUMENT: HCPCS | Performed by: SURGERY

## 2021-02-23 PROCEDURE — 1090F PRES/ABSN URINE INCON ASSESS: CPT | Performed by: SURGERY

## 2021-02-23 PROCEDURE — 4040F PNEUMOC VAC/ADMIN/RCVD: CPT | Performed by: SURGERY

## 2021-02-23 PROCEDURE — G8482 FLU IMMUNIZE ORDER/ADMIN: HCPCS | Performed by: SURGERY

## 2021-02-23 PROCEDURE — 1123F ACP DISCUSS/DSCN MKR DOCD: CPT | Performed by: SURGERY

## 2021-02-23 PROCEDURE — 1036F TOBACCO NON-USER: CPT | Performed by: SURGERY

## 2021-02-23 PROCEDURE — 99203 OFFICE O/P NEW LOW 30 MIN: CPT | Performed by: SURGERY

## 2021-02-23 PROCEDURE — G8417 CALC BMI ABV UP PARAM F/U: HCPCS | Performed by: SURGERY

## 2021-02-23 PROCEDURE — G8427 DOCREV CUR MEDS BY ELIG CLIN: HCPCS | Performed by: SURGERY

## 2021-02-23 NOTE — PROGRESS NOTES
[facility] Vascular Surgery  Taya Mckay MD, AREND[place], [address]    OUTPATIENT CONSULTATION    Date of Consultation:  2/23/2021    PCP:  Fam Shields MD       Chief Complaint: Right leg varicose veins    History of Present Illness: Orlando Ku is a very nice and healthy/active-appearing [de-identified] y.o. female who presents today for progressively worsening varicose veins of the bilateral lower extremities, right worse than left. She has had these for years, but these have become more noticeable and she is having pain at times at the sites. She does wear light compression, 15 to 20 mmHg knee-high's. She denies significant swelling. Also, significantly, she complains of pain in the right lateral ankle which is improved when she applies pressure. She does a lot of walking, hiking. She is concerned that this will continue to worsen and keep her from her daily activities. 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 She underwent the following study, images which I personally reviewed:  VL Reflux Vinayak Insufficiency Stdy Bilat  1/29/2021  Summary        No evidence of deep or superficial venous thrombosis bilateral legs.        Reflux right GSV and anterior accessory SV.        Reflux left GSV as described above. Past Medical History:  Past Medical History:   Diagnosis Date    DVT (deep venous thrombosis) ([address] 2017    after left TKR     Past Surgical History:  No past surgical history on file. Home Medications:   Prior to Admission medications    Medication Sig Start Date End Date Taking?  Authorizing Provider   hydroCHLOROthiazide (MICROZIDE) 12.5 MG capsule Take 1 capsule by mouth every morning 12/28/20   Wendy Martinez MD   FOLBIC 2.5-25-2 MG TABS TAKE ONE TABLET BY MOUTH DAILY 10/5/20   Wendy Martinez MD   atorvastatin (LIPITOR) 40 MG tablet TAKE 1 TABLET BY MOUTH DAILY 9/21/20   Wendy Martinez MD amLODIPine (NORVASC) 2.5 MG tablet TAKE 1 TABLET BY MOUTH DAILY 20   Caio Shaffer MD   Loratadine (CLARITIN PO) Take 10 mg by mouth as needed    Historical Provider, MD   Omega-3 Fatty Acids (FISH OIL) 1000 MG CAPS Take 1,000 mg by mouth daily 04   Historical Provider, MD   Glucosamine-Chondroit-Vit C-Mn (GLUCOSAMINE 1500 COMPLEX) CAPS Take 1 tablet by mouth daily    Historical Provider, MD   aspirin 1 MG/ML SUSP Take 81 mg by mouth daily    Historical Provider, MD      Allergies:  Atenolol, Sulfa antibiotics, Lisinopril, and Meperidine     Social History:    Social History     Socioeconomic History    Marital status:      Spouse name: Cinthia Fabry Number of children: Not on file    Years of education: Not on file    Highest education level: Not on file   Occupational History    Not on file   Social Needs    Financial resource strain: Not on file    Food insecurity     Worry: Not on file     Inability: Not on file   [employer] needs     Medical: Not on file     Non-medical: Not on file   Tobacco Use    Smoking status: Former Smoker     Years: 15.00     Types: Cigarettes     Quit date: 1980     Years since quittin.1    Smokeless tobacco: Former User    Tobacco comment: stop smoking    Substance and Sexual Activity    Alcohol use:  Yes     Alcohol/week: 1.0 standard drinks     Types: 1 Glasses of wine per week     Frequency: Monthly or less    Drug use: Never    Sexual activity: Not Currently   Lifestyle    Physical activity     Days per week: Not on file     Minutes per session: Not on file    Stress: Not on file   Relationships    Social connections     Talks on phone: Not on file     Gets together: Not on file     Attends Congregational service: Not on file     Active member of club or organization: Not on file     Attends meetings of clubs or organizations: Not on file     Relationship status: Not on file    Intimate partner violence     Fear of current or ex partner: Not on file     Emotionally abused: Not on file     Physically abused: Not on file     Forced sexual activity: Not on file   Other Topics Concern    Not on file   Social History Narrative    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 Multiple grandchildren. Family History:      Problem Relation Age of Onset    Deep Vein Thrombosis Mother 35    Breast Cancer Daughter 44     Review of Systems:  Pertinent positive and negative items are noted in the HPI. A comprehensive review of all other symptoms is otherwise negative. PhysicalExamination:    Vitals:    02/23/21 0950   Temp: 97.3 °F (36.3 °C)   TempSrc: Temporal   Weight: 156 lb (70.8 kg)   Height: 5' 3\" (1.6 m)     Body mass index is 27.63 kg/m². Physical Exam  Constitutional:       General: She is not in acute distress. Appearance: Normal appearance. She is normal weight. She is not ill-appearing. Comments: Appears younger than stated age. Quite healthy-appearing. Cardiovascular:      Rate and Rhythm: Normal rate and regular rhythm. Heart sounds: No murmur. No friction rub. No gallop. Pulmonary:      Effort: Pulmonary effort is normal. No respiratory distress. Breath sounds: Normal breath sounds. No stridor. No wheezing, rhonchi or rales. Musculoskeletal:        Legs:    Skin:     General: Skin is warm and dry. Capillary Refill: Capillary refill takes less than 2 seconds. Coloration: Skin is not jaundiced. Findings: No rash. Neurological:      General: No focal deficit present. Mental Status: She is alert and oriented to person, place, and time. Cranial Nerves: No cranial nerve deficit. Psychiatric:         Mood and Affect: Mood normal.         Behavior: Behavior normal.         Thought Content: Thought content normal.         Judgment: Judgment normal.      Pulses:    DP PT   RIGHT 2 2   LEFT 2 2     Diagnosis:     1.   Bilateral lower extremity varicose veins with pain. --Recommend compression stockings, higher compression, 20 to 30 mmHg knee-high's   --Re-evaluate symptoms in 4 weeks  2. Right lateral ankle pain   --Check MRI foot. She may also benefit from physical therapy to the foot. It seems musculoskeletal in nature. Further recommendations when she returns for follow up.      Plan:

## 2021-03-03 ENCOUNTER — OFFICE VISIT (OUTPATIENT)
Dept: INTERNAL MEDICINE CLINIC | Age: 81
End: 2021-03-03

## 2021-03-03 ENCOUNTER — TELEPHONE (OUTPATIENT)
Dept: INTERNAL MEDICINE CLINIC | Age: 81
End: 2021-03-03

## 2021-03-03 VITALS
OXYGEN SATURATION: 97 % | SYSTOLIC BLOOD PRESSURE: 138 MMHG | HEART RATE: 62 BPM | BODY MASS INDEX: 27.32 KG/M2 | RESPIRATION RATE: 12 BRPM | WEIGHT: 154.2 LBS | DIASTOLIC BLOOD PRESSURE: 70 MMHG

## 2021-03-03 DIAGNOSIS — Z01.818 PRE-OP EVALUATION: Primary | ICD-10-CM

## 2021-03-03 NOTE — PROGRESS NOTES
[facility]  Preoperative Evaluation  Hakeem Romo DO  Internal Medicine      French Grayson  YOB: 1940    Date of Service:  3/3/2021    Chief Complaint   Patient presents with    Pre-op Exam     [employer] Bladder stimulator 3/22/21 Dr Judit Duke @ [facility]        HPI:    This patient presents tothe office today for a preoperative evaluation at the request of surgeon, Dr. Judit Duke, who plans on performing bladder stimulator on  at [facility].  The current problem began over the years secondary to multiple surgeries, and symptoms have been stable with time. Conservative therapy: N/A. Planned anesthesia: MAC  Known anesthesia problems: None   Bleeding risk: No recent or remote history of abnormal bleeding  Personal or FH of DVT/PE: Yes - had a DVT after total knee surgery about 3 years ago. Mother  from DVT/PE following surgery.   Recent steroid use: no  Exercise tolerance before surgery at least 4 METS (Can walk up a flight of steps or a hill or walk on level ground at 3 to 4 mph): Yes   Patient objection to receiving blood products: No    Patient Active Problem List   Diagnosis    Essential hypertension    Elevated LFTs    Atrial premature beats    Aortic valve sclerosis    Family history of heart disease in male family member before age 54    Family history of sudden death   Joselyn Hopson History of deep vein thrombosis of lower extremity    Increased frequency of urination    Reflex sympathetic dystrophy of lower limb    Mixed hyperlipidemia    MVP (mitral valve prolapse)    Non-rheumatic mitral regurgitation    OAB (overactive bladder)    Osteoporosis    Status post total left knee replacement    Left ovarian cyst    Carcinoma, renal cell, right (HCC)    History of thyroid nodule    Varicose veins of leg with swelling, right    Rosacea       Review of Systems 14 - point ROS was reviewed and negative except for that noted above in the HPI    Allergies   Allergen Reactions    Atenolol      Avoid beta blockers; sino atrial node dysfunction Dr Jordon Blunt 2013    Sulfa Antibiotics Nausea And Vomiting     Dizziness    Lisinopril Swelling    Meperidine Nausea And Vomiting     Lightheadness     Outpatient Medications Marked as Taking for the 3/3/21 encounter (Office Visit) with Jered Bey, DO   Medication Sig Dispense Refill    amLODIPine (NORVASC) 5 MG tablet Take 1 tablet by mouth daily 90 tablet 1    hydroCHLOROthiazide (MICROZIDE) 12.5 MG capsule Take 1 capsule by mouth every morning 90 capsule 1    FOLBIC 2.5-25-2 MG TABS TAKE ONE TABLET BY MOUTH DAILY 90 tablet 3    atorvastatin (LIPITOR) 40 MG tablet TAKE 1 TABLET BY MOUTH DAILY 90 tablet 1    Loratadine (CLARITIN PO) Take 10 mg by mouth as needed      Omega-3 Fatty Acids (FISH OIL) 1000 MG CAPS Take 1,000 mg by mouth daily      Glucosamine-Chondroit-Vit C-Mn (GLUCOSAMINE 1500 COMPLEX) CAPS Take 1 tablet by mouth daily      aspirin 1 MG/ML SUSP Take 81 mg by mouth daily         Past Medical History:   Diagnosis Date    DVT (deep venous thrombosis) ([facility] [address] 2017    after left TKR     No past surgical history on file.   Family History   Problem Relation Age of Onset    Deep Vein Thrombosis Mother 35    Breast Cancer Daughter 44     Social History     Socioeconomic History    Marital status:      Spouse name: Olinda Tejeda Number of children: Not on file    Years of education: Not on file    Highest education level: Not on file   Occupational History    Not on file   Social Needs    Financial resource strain: Not on file    Food insecurity     Worry: Not on file     Inability: Not on file   [employer] needs     Medical: Not on file     Non-medical: Not on file   Tobacco Use    Smoking status: Former Smoker     Years: 15.00     Types: Cigarettes     Quit date: 1980     Years since quittin.1    Smokeless tobacco: Former User    Tobacco comment: stop smoking    Substance and Sexual Activity    Alcohol use: Yes     Alcohol/week: 1.0 standard drinks     Types: 1 Glasses of wine per week     Frequency: Monthly or less    Drug use: Never    Sexual activity: Not Currently   Lifestyle    Physical activity     Days per week: Not on file     Minutes per session: Not on file    Stress: Not on file   Relationships    Social connections     Talks on phone: Not on file     Gets together: Not on file     Attends Nondenominational service: Not on file     Active member of club or organization: Not on file     Attends meetings of clubs or organizations: Not on file     Relationship status: Not on file    Intimate partner violence     Fear of current or ex partner: Not on file     Emotionally abused: Not on file     Physically abused: Not on file     Forced sexual activity: Not on file   Other Topics Concern    Not on file   Social History Narrative    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 Multiple grandchildren. Objective:    Vitals:    03/03/21 0955   BP: 138/70   Pulse: 62   Resp: 12   SpO2: 97%   Weight: 154 lb 3.2 oz (69.9 kg)       Wt Readings from Last 2 Encounters:   03/03/21 154 lb 3.2 oz (69.9 kg)   02/23/21 156 lb (70.8 kg)     BP Readings from Last 3 Encounters:   03/03/21 138/70   02/23/21 138/74   01/26/21 (!) 160/68      Physical Exam  Constitutional:       General: She is not in acute distress. Appearance: Normal appearance. She is not ill-appearing. HENT:      Head: Normocephalic and atraumatic. Right Ear: External ear normal.      Left Ear: External ear normal.   Eyes:      General: No scleral icterus. Extraocular Movements: Extraocular movements intact. Conjunctiva/sclera: Conjunctivae normal.   Neck:      Musculoskeletal: Normal range of motion. No muscular tenderness. Cardiovascular:      Rate and Rhythm: Normal rate and regular rhythm. Pulses: Normal pulses. Heart sounds: No murmur. No friction rub.  No gallop. Pulmonary:      Effort: Pulmonary effort is normal. No respiratory distress. Breath sounds: Normal breath sounds. No wheezing, rhonchi or rales. Abdominal:      General: Bowel sounds are normal. There is no distension. Palpations: Abdomen is soft. There is no mass. Tenderness: There is no abdominal tenderness. There is no guarding or rebound. Musculoskeletal: Normal range of motion. General: No swelling or tenderness. Lymphadenopathy:      Cervical: No cervical adenopathy. Skin:     General: Skin is warm. Coloration: Skin is not jaundiced. Findings: No erythema or rash. Neurological:      General: No focal deficit present. Mental Status: She is alert and oriented to person, place, and time. Psychiatric:         Mood and Affect: Mood normal.         Behavior: Behavior normal.          Lab Results   Component Value Date    HDL 58 01/29/2021    LDLCALC 71 01/29/2021     No results found for: GLUF, LABA1C  Lab Results   Component Value Date     01/29/2021    K 3.8 01/29/2021    BUN 19 01/29/2021    CREATININE 0.6 01/29/2021    LABGLOM >60 01/29/2021    GFRAA >60 01/29/2021    CALCIUM 9.1 01/29/2021    VITD25 45.9 01/29/2021     Lab Results   Component Value Date    ALT 24 01/29/2021    AST 28 01/29/2021     Lab Results   Component Value Date    HGB 14.1 01/29/2021     Lab Results   Component Value Date    TSHREFLEX 2.20 01/29/2021    TSH 2.48 01/05/2011             Assessment/Plan:     1. Pre-op evaluation  Low risk for a low risk surgery. Okay to proceed with surgery.            Emergent procedure No  Active Cardiac Condition (decompensated HF, Arrhythmia, MI <3 weeks, severe valve disease):  No   Risk Level of Procedure Low Risk (endoscopy, superficial skin, breast, ambulatory, or cataract, etc.)  Revised Cardiac Risk Index Risk factors: None    Patient is low risk for major cardiac complications during a low risk procedure and may continue as planned. Preoperative workup as follows: none  Change in medication regimen before surgery: Hold all medications on morning of surgery, can take amlodipine day of if surgery is later in the day. Prophylaxis for cardiac events with perioperative beta-blockers:Not indicated    Deep vein thrombosis prophylaxis: regimen to be chosen by surgical team of note patient does have hx of prior DVT post op.     Further recommendations requested from consultants: No    --Chely MADDOX [address] [place] [address], DO on 3/3/2021 at 10:29 AM

## 2021-03-04 ENCOUNTER — TELEPHONE (OUTPATIENT)
Dept: OTHER | Age: 81
End: 2021-03-04

## 2021-03-04 DIAGNOSIS — I10 ESSENTIAL HYPERTENSION: ICD-10-CM

## 2021-03-04 RX ORDER — AMLODIPINE BESYLATE 5 MG/1
5 TABLET ORAL DAILY
Qty: 90 TABLET | Refills: 1 | Status: SHIPPED | OUTPATIENT
Start: 2021-03-04 | End: 2021-09-07

## 2021-03-04 NOTE — TELEPHONE ENCOUNTER
Patient states her amlodipine was called in to the wrong pharmacy. Please cancell at the [facility] pharmacy and call into   [place] [id number] [facility], [address] [place] [address] [phone]    Patient is requesting this be done today. Patient made aware to allow 24 to 48 business hours for prescription refills.

## 2021-03-06 ENCOUNTER — HOSPITAL ENCOUNTER (OUTPATIENT)
Dept: MRI IMAGING | Age: 81
Discharge: HOME | End: 2021-03-06
Payer: MEDICARE

## 2021-03-06 DIAGNOSIS — M79.671 RIGHT FOOT PAIN: ICD-10-CM

## 2021-03-06 PROCEDURE — A9579 GAD-BASE MR CONTRAST NOS,1ML: HCPCS | Performed by: SURGERY

## 2021-03-06 PROCEDURE — 73723 MRI JOINT LWR EXTR W/O&W/DYE: CPT

## 2021-03-06 PROCEDURE — 6360000004 HC RX CONTRAST MEDICATION: Performed by: SURGERY

## 2021-03-06 RX ADMIN — GADOTERIDOL 15 ML: 279.3 INJECTION, SOLUTION INTRAVENOUS at 11:14

## 2021-03-15 DIAGNOSIS — E78.2 MIXED HYPERLIPIDEMIA: ICD-10-CM

## 2021-03-15 RX ORDER — ATORVASTATIN CALCIUM 40 MG/1
40 TABLET, FILM COATED ORAL DAILY
Qty: 90 TABLET | Refills: 1 | Status: SHIPPED | OUTPATIENT
Start: 2021-03-15 | End: 2021-09-09

## 2021-03-23 ENCOUNTER — OFFICE VISIT (OUTPATIENT)
Dept: VASCULAR SURGERY | Age: 81
End: 2021-03-23
Payer: MEDICARE

## 2021-03-23 VITALS
SYSTOLIC BLOOD PRESSURE: 142 MMHG | DIASTOLIC BLOOD PRESSURE: 75 MMHG | HEIGHT: 63 IN | TEMPERATURE: 96.7 F | BODY MASS INDEX: 27.43 KG/M2 | WEIGHT: 154.8 LBS | HEART RATE: 64 BPM

## 2021-03-23 DIAGNOSIS — M79.671 FOOT PAIN, RIGHT: Primary | ICD-10-CM

## 2021-03-23 PROCEDURE — 99214 OFFICE O/P EST MOD 30 MIN: CPT | Performed by: SURGERY

## 2021-03-23 PROCEDURE — G8427 DOCREV CUR MEDS BY ELIG CLIN: HCPCS | Performed by: SURGERY

## 2021-03-23 PROCEDURE — G8399 PT W/DXA RESULTS DOCUMENT: HCPCS | Performed by: SURGERY

## 2021-03-23 PROCEDURE — G8482 FLU IMMUNIZE ORDER/ADMIN: HCPCS | Performed by: SURGERY

## 2021-03-23 PROCEDURE — 4040F PNEUMOC VAC/ADMIN/RCVD: CPT | Performed by: SURGERY

## 2021-03-23 PROCEDURE — G8417 CALC BMI ABV UP PARAM F/U: HCPCS | Performed by: SURGERY

## 2021-03-23 PROCEDURE — 1123F ACP DISCUSS/DSCN MKR DOCD: CPT | Performed by: SURGERY

## 2021-03-23 PROCEDURE — 1036F TOBACCO NON-USER: CPT | Performed by: SURGERY

## 2021-03-23 PROCEDURE — 1090F PRES/ABSN URINE INCON ASSESS: CPT | Performed by: SURGERY

## 2021-03-23 NOTE — PROGRESS NOTES
Yash Hicks (:  1940) is a [de-identified] y.o. female,Established patient, here for evaluation of the following chief complaint(s):  Follow-up (4 week follow up, MRI has been performed as of  3/6/21)      ASSESSMENT/PLAN:  1. Foot pain, right  -     Ambulatory referral to Podiatry   Dr. Chichi Ascencio Osvaldo--contact information provided    I believe that most of her foot pain is musculoskeletal.  I do not think pain is associated with venous insufficiency and therefore I do not recommend venous intervention at this time, nor does she wish it. I have recommended referral to podiatry for further evaluation and intervention as needed. All questions were answered and I will be happy to see the patient at any time on an as-needed basis. No follow-ups on file. SUBJECTIVE/OBJECTIVE:  Severo Mathews returns today in follow-up of her right foot MRI. Right lateral ankle pain is about the same. She is walking more. She is wearing a higher grade compression/soft foot brace with some improvement. She initially came to me for appearance of her right anterior leg varicose veins, but states these are not bothering her at this time. She is most concerned with her right ankle pain, as she is going on a track with her grandson to [place] in the summertime. I personally reviewed images the following study, and discussed these in detail with the patient:  MRI ANKLE RIGHT W WO CONTRAST  3/6/21  Impression       1. Evidence of irregular osteochondral defect and erosions affecting the tibial plafond and above the dome of the talus along the mid to lateral posterior margin with subchondral cystic changes and osteochondral defect as described above.  Subchondral    cystic changes along the medial aspect of the lateral malleolus also appreciated with subchondral erosions.       2. Subchondral cystic changes and erosion at the angle of the calcaneus with inflammation in the sinus tarsi, seen as fat replacement and inflammation with enhancement       3. Flattening of the midfoot arch and some mild midfoot arthropathy noted. No dome of talus defect. Mild anterior subtalar joint arthropathy appreciated        Physical Exam     Vitals:    03/23/21 0954   BP: (!) 142/75   Site: Left Upper Arm   Position: Sitting   Cuff Size: Medium Adult   Pulse: 64   Temp: 96.7 °F (35.9 °C)   TempSrc: Temporal   Weight: 154 lb 12.8 oz (70.2 kg)   Height: 5' 3\" (1.6 m)       On exam, there is no swelling of the lower extremities. No significant stasis discoloration or stasis dermatitis. She has a cluster of large telangiectasias along the right anterior lower leg, just below the knee. These are soft and nonphlebitic. No palpable cords. Right foot is warm and well-perfused with no discoloration and normal capillary refill. Palpable dorsalis pedis and posterior tibial pulses. Mild tenderness to palpation along the right anterior lateral foot, just anterior to the lateral malleolus. On this date 3/23/2021 I have spent 30 minutes reviewing previous notes, test results and face to face with the patient discussing the diagnosis and importance of compliance with the treatment plan as well as documenting on the day of the visit. An electronic signature was used to authenticate this note.     --Alicia Simmons MD

## 2021-04-26 ENCOUNTER — OFFICE VISIT (OUTPATIENT)
Dept: INTERNAL MEDICINE CLINIC | Age: 81
End: 2021-04-26
Payer: MEDICARE

## 2021-04-26 VITALS
DIASTOLIC BLOOD PRESSURE: 76 MMHG | RESPIRATION RATE: 12 BRPM | HEART RATE: 63 BPM | BODY MASS INDEX: 27.17 KG/M2 | SYSTOLIC BLOOD PRESSURE: 136 MMHG | OXYGEN SATURATION: 99 % | WEIGHT: 153.4 LBS

## 2021-04-26 DIAGNOSIS — N32.81 OAB (OVERACTIVE BLADDER): ICD-10-CM

## 2021-04-26 DIAGNOSIS — R35.0 URINARY FREQUENCY: ICD-10-CM

## 2021-04-26 DIAGNOSIS — Z12.31 ENCOUNTER FOR SCREENING MAMMOGRAM FOR MALIGNANT NEOPLASM OF BREAST: ICD-10-CM

## 2021-04-26 DIAGNOSIS — C64.1 CARCINOMA, RENAL CELL, RIGHT (HCC): ICD-10-CM

## 2021-04-26 DIAGNOSIS — I10 ESSENTIAL HYPERTENSION: Primary | ICD-10-CM

## 2021-04-26 PROCEDURE — 1123F ACP DISCUSS/DSCN MKR DOCD: CPT | Performed by: INTERNAL MEDICINE

## 2021-04-26 PROCEDURE — 1090F PRES/ABSN URINE INCON ASSESS: CPT | Performed by: INTERNAL MEDICINE

## 2021-04-26 PROCEDURE — G8399 PT W/DXA RESULTS DOCUMENT: HCPCS | Performed by: INTERNAL MEDICINE

## 2021-04-26 PROCEDURE — G8427 DOCREV CUR MEDS BY ELIG CLIN: HCPCS | Performed by: INTERNAL MEDICINE

## 2021-04-26 PROCEDURE — G8417 CALC BMI ABV UP PARAM F/U: HCPCS | Performed by: INTERNAL MEDICINE

## 2021-04-26 PROCEDURE — 99214 OFFICE O/P EST MOD 30 MIN: CPT | Performed by: INTERNAL MEDICINE

## 2021-04-26 PROCEDURE — 4040F PNEUMOC VAC/ADMIN/RCVD: CPT | Performed by: INTERNAL MEDICINE

## 2021-04-26 PROCEDURE — 1036F TOBACCO NON-USER: CPT | Performed by: INTERNAL MEDICINE

## 2021-04-26 RX ORDER — FOLIC ACID-PYRIDOXINE-CYANOCOBALAMIN TAB 2.5-25-2 MG 2.5-25-2 MG
TAB ORAL
Qty: 90 TABLET | Refills: 3 | Status: SHIPPED | OUTPATIENT
Start: 2021-04-26 | End: 2022-02-10 | Stop reason: SDUPTHER

## 2021-04-26 ASSESSMENT — ENCOUNTER SYMPTOMS
COUGH: 0
VOMITING: 0
WHEEZING: 0
ABDOMINAL PAIN: 0
DIARRHEA: 0
CONSTIPATION: 0
NAUSEA: 0
SHORTNESS OF BREATH: 0

## 2021-04-26 NOTE — ASSESSMENT & PLAN NOTE
Monitored by specialist- no acute findings meriting change in the plan. [facility].  CT scan set up for Imelda

## 2021-04-26 NOTE — PROGRESS NOTES
Nova العراقي (:  1940) is a [de-identified] y.o. female,Established patient, here for evaluation of the following chief complaint(s):  Follow-up      ASSESSMENT/PLAN:  1. Essential hypertension  Assessment & Plan:   Borderline controlled, changes made today: okay to trial off of HCTZ to see if improves voiding/OAB. Continue amlodipine. Increase amlodipine as needed. 2. Urinary frequency  3. Encounter for screening mammogram for malignant neoplasm of breast  -     [facility] DIGITAL SCREEN W OR WO CAD BILATERAL; Future  4. Carcinoma, renal cell, right [facility])  Assessment & Plan:   Monitored by specialist- no acute findings meriting change in the plan. [facility]. CT scan set up for . OAB (overactive bladder)  Comments:  Decided against SNS. Following with Dr. Yaya Mohamud. Trial off of HCTZ. Return for 4-6 weeks HTN f/u.    SUBJECTIVE/OBJECTIVE:  HPI    Taking amlodipoine 5mg daily and HCTZ 12.5mg daily for BP. WaS PREVIOUSLY planning on an SNS with Dr. Yaya Mohamud but decided not to after some life stressors (brother had a sudden hemorrhagic stroke and she was his [de-identified]) and unsure of benefit after doing some urinary tracking. Good volume voids. Interested in trial off of HCTZ prior to her trip to [facility] in July. Seeing Dr. Jessica Montes tomorrow about right ankle pain. Wearing compression has helped even her ankle. MRI ANKLE RIGHT W WO CONTRAST  3/6/21  Impression       1. Evidence of irregular osteochondral defect and erosions affecting the tibial plafond and above the dome of the talus along the mid to lateral posterior margin with subchondral cystic changes and osteochondral defect as described above. Subchondral    cystic changes along the medial aspect of the lateral malleolus also appreciated with subchondral erosions.       2. Subchondral cystic changes and erosion at the angle of the calcaneus with inflammation in the sinus tarsi, seen as fat replacement and inflammation with enhancement       3.

## 2021-06-15 ENCOUNTER — HOSPITAL ENCOUNTER (OUTPATIENT)
Dept: MAMMOGRAPHY | Age: 81
Discharge: HOME | End: 2021-06-15
Payer: MEDICARE

## 2021-06-15 VITALS — HEIGHT: 63 IN | BODY MASS INDEX: 26.58 KG/M2 | WEIGHT: 150 LBS

## 2021-06-15 DIAGNOSIS — Z12.31 ENCOUNTER FOR SCREENING MAMMOGRAM FOR MALIGNANT NEOPLASM OF BREAST: ICD-10-CM

## 2021-06-15 PROCEDURE — 77063 BREAST TOMOSYNTHESIS BI: CPT

## 2021-06-24 ENCOUNTER — OFFICE VISIT (OUTPATIENT)
Dept: INTERNAL MEDICINE CLINIC | Age: 81
End: 2021-06-24
Payer: MEDICARE

## 2021-06-24 VITALS
DIASTOLIC BLOOD PRESSURE: 62 MMHG | WEIGHT: 149.6 LBS | SYSTOLIC BLOOD PRESSURE: 136 MMHG | HEART RATE: 72 BPM | OXYGEN SATURATION: 99 % | RESPIRATION RATE: 12 BRPM | BODY MASS INDEX: 26.5 KG/M2

## 2021-06-24 DIAGNOSIS — A09 TRAVELER'S DIARRHEA: Primary | ICD-10-CM

## 2021-06-24 DIAGNOSIS — R91.8 ABNORMAL CT SCAN OF LUNG: ICD-10-CM

## 2021-06-24 DIAGNOSIS — N32.81 OAB (OVERACTIVE BLADDER): ICD-10-CM

## 2021-06-24 DIAGNOSIS — E78.2 MIXED HYPERLIPIDEMIA: ICD-10-CM

## 2021-06-24 DIAGNOSIS — I10 ESSENTIAL HYPERTENSION: ICD-10-CM

## 2021-06-24 DIAGNOSIS — C64.1 CARCINOMA, RENAL CELL, RIGHT (HCC): ICD-10-CM

## 2021-06-24 DIAGNOSIS — R91.8 GROUND GLASS OPACITY PRESENT ON IMAGING OF LUNG: ICD-10-CM

## 2021-06-24 PROCEDURE — 1123F ACP DISCUSS/DSCN MKR DOCD: CPT | Performed by: INTERNAL MEDICINE

## 2021-06-24 PROCEDURE — G8427 DOCREV CUR MEDS BY ELIG CLIN: HCPCS | Performed by: INTERNAL MEDICINE

## 2021-06-24 PROCEDURE — G8399 PT W/DXA RESULTS DOCUMENT: HCPCS | Performed by: INTERNAL MEDICINE

## 2021-06-24 PROCEDURE — G8417 CALC BMI ABV UP PARAM F/U: HCPCS | Performed by: INTERNAL MEDICINE

## 2021-06-24 PROCEDURE — 1036F TOBACCO NON-USER: CPT | Performed by: INTERNAL MEDICINE

## 2021-06-24 PROCEDURE — 1090F PRES/ABSN URINE INCON ASSESS: CPT | Performed by: INTERNAL MEDICINE

## 2021-06-24 PROCEDURE — 99214 OFFICE O/P EST MOD 30 MIN: CPT | Performed by: INTERNAL MEDICINE

## 2021-06-24 PROCEDURE — 4040F PNEUMOC VAC/ADMIN/RCVD: CPT | Performed by: INTERNAL MEDICINE

## 2021-06-24 RX ORDER — NITROFURANTOIN 25; 75 MG/1; MG/1
100 CAPSULE ORAL 2 TIMES DAILY
Qty: 20 CAPSULE | Refills: 0 | Status: SHIPPED | OUTPATIENT
Start: 2021-06-24 | End: 2021-07-04

## 2021-06-24 RX ORDER — AZITHROMYCIN 250 MG/1
250 TABLET, FILM COATED ORAL SEE ADMIN INSTRUCTIONS
Qty: 6 TABLET | Refills: 0 | Status: SHIPPED | OUTPATIENT
Start: 2021-06-24 | End: 2021-06-29

## 2021-06-24 NOTE — PROGRESS NOTES
Nora Fajardo (:  1940) is a [de-identified] y.o. female,Established patient, here for evaluation of the following chief complaint(s):  Hypertension      ASSESSMENT/PLAN:  1. Traveler's diarrhea  Rx given for azithromycin for potential traveler's diarrhea or respiratory illness while on trip to [place] next month. She was also given a prescription for Macrobid in case of UTI. -     azithromycin (ZITHROMAX) 250 MG tablet; Take 1 tablet by mouth See Admin Instructions for 5 days 500mg on day 1 followed by 250mg on days 2 - 5, Disp-6 tablet, R-0Normal  2. Abnormal CT scan of lung  GGO's at bases and increased reticular markings. She is minimally symptomatic. I want to repeat a CT scan of the chest dedicated to evaluate this when she gets back from [place]. It may be that they just caught her in expiration and that this is just normal but unfortunately it needs pursued further.  -     CT CHEST WO CONTRAST; Future  3. Ground glass opacity present on imaging of lung  -     CT CHEST WO CONTRAST; Future  4. Essential hypertension  Assessment & Plan:  BP at goal on amlodipine 5 mg daily. Continue that medication   5. Carcinoma, renal cell, right [facility])  Assessment & Plan:  Following at [facility]. Slight increase in size of right RCC. Appointment next week with urology. 6. Mixed hyperlipidemia  Assessment & Plan: Tolerating her statin. They did comment on some coronary artery calcification seen on CT. She is also takes an aspirin. 7. OAB (overactive bladder)  Assessment & Plan:  Improved with DC of HCTZ. Return in about 3 months (around 2021) for chronic conditions. SUBJECTIVE/OBJECTIVE:  HPI    Blood pressure has been fine off of the hydrochlorothiazide and her urination is better. She had her CT scan for follow-up of her renal cell carcinoma which showed some small interval growth. She has an appointment  [facility] to discuss.     That CAT scan did incidentally show some groundglass opacities at the bases of the lungs as well as some increased reticular markings. These are of unclear significance. Overall her breathing is fine but she does feel like it has gotten slightly worse with exercise of her age. She might not have otherwise thought about it however. She does not cough. She did have some sinus congestion at the time of the CT scan. They did not comment on her ovaries. Review of Systems    Current Outpatient Medications on File Prior to Visit   Medication Sig Dispense Refill    folic acid-pyridoxine-cyancobalamin (FOLBIC) 2.5-25-2 MG TABS TAKE ONE TABLET BY MOUTH DAILY 90 tablet 3    atorvastatin (LIPITOR) 40 MG tablet TAKE 1 TABLET BY MOUTH DAILY 90 tablet 1    amLODIPine (NORVASC) 5 MG tablet Take 1 tablet by mouth daily 90 tablet 1    Loratadine (CLARITIN PO) Take 10 mg by mouth as needed      Glucosamine-Chondroit-Vit C-Mn (GLUCOSAMINE 1500 COMPLEX) CAPS Take 1 tablet by mouth daily      aspirin 1 MG/ML SUSP Take 81 mg by mouth daily       No current facility-administered medications on file prior to visit. Vitals:    06/24/21 0958   BP: 136/62   Pulse:    Resp:    SpO2:      Physical Exam  Constitutional:       General: She is not in acute distress. Appearance: Normal appearance. She is not diaphoretic. HENT:      Head: Normocephalic and atraumatic. Right Ear: Tympanic membrane, ear canal and external ear normal.      Left Ear: Tympanic membrane, ear canal and external ear normal.      Nose: Nose normal.      Mouth/Throat:      Mouth: Mucous membranes are moist.      Pharynx: Oropharynx is clear. Eyes:      General: No scleral icterus. Conjunctiva/sclera: Conjunctivae normal.   Cardiovascular:      Rate and Rhythm: Normal rate and regular rhythm. Pulses: Normal pulses. Heart sounds: Normal heart sounds. No murmur heard. No friction rub. No gallop.     Pulmonary:      Effort: Pulmonary effort is normal.      Breath sounds: Normal breath sounds. No wheezing, rhonchi or rales. Abdominal:      General: Abdomen is flat. Bowel sounds are normal.   Musculoskeletal:         General: No deformity. Cervical back: Normal range of motion. Right lower leg: No edema. Left lower leg: No edema. Lymphadenopathy:      Cervical: No cervical adenopathy. Skin:     General: Skin is warm and dry. Findings: No rash. Neurological:      General: No focal deficit present. Mental Status: She is alert. Coordination: Coordination normal.      Gait: Gait normal.   Psychiatric:         Mood and Affect: Mood normal.         Behavior: Behavior normal.           On this date 6/24/2021 I have spent 35 minutes reviewing previous notes, test results and face to face with the patient discussing the diagnosis and importance of compliance with the treatment plan as well as documenting on the day of the visit. An electronic signature was used to authenticate this note.     --Loan Wells MD

## 2021-06-24 NOTE — ASSESSMENT & PLAN NOTE
Tolerating her statin. They did comment on some coronary artery calcification seen on CT. She is also takes an aspirin.

## 2021-06-24 NOTE — ASSESSMENT & PLAN NOTE
Following at [facility]. Slight increase in size of right RCC. Appointment next week with urology.

## 2021-07-08 ENCOUNTER — PATIENT MESSAGE (OUTPATIENT)
Dept: INTERNAL MEDICINE CLINIC | Age: 81
End: 2021-07-08

## 2021-07-08 DIAGNOSIS — R30.0 DYSURIA: Primary | ICD-10-CM

## 2021-07-08 NOTE — TELEPHONE ENCOUNTER
From: Geno Ye  To: Deidra Garcia MD  Sent: 7/8/2021 10:32 AM EDT  Subject: Prescription Question    Good morning,  Three days ago I experienced urethral spasms and frequent voiding in small amounts. I began taking over-the-counter phenazopyridine twice a day which has brought some relief of the spasm pain. I had been biking over the weekend, which occasionally has caused urethral spasms in the past but then subsided in a few days. Because my trip to [facility] is coming up in a couple of weeks, I am wondering if I should get my urine tested for a UTI or just begin taking the 10-day supply of nitrofurantoin you prescribed for me to take on the trip.    Thank you

## 2021-07-09 ENCOUNTER — TELEPHONE (OUTPATIENT)
Dept: INTERNAL MEDICINE CLINIC | Age: 81
End: 2021-07-09

## 2021-07-09 DIAGNOSIS — R30.0 DYSURIA: ICD-10-CM

## 2021-07-09 NOTE — TELEPHONE ENCOUNTER
Dejah Bruno has patient with her now at [facility]. Patient is telling her that her order is to be stat but the order wasn't written as a stat. Please contact Sheyla to clarify and rewrite order if necessary.

## 2021-07-09 NOTE — TELEPHONE ENCOUNTER
Spoke with lab advised to run test as is they understood.     Also spoke with patient to advise her we will be in touch with results she understood and had no further questions

## 2021-07-10 LAB
BILIRUBIN URINE: NEGATIVE
BLOOD, URINE: ABNORMAL
CLARITY: ABNORMAL
COLOR: ABNORMAL
CRYSTALS, UA: ABNORMAL /HPF
EPITHELIAL CELLS, UA: 0 /HPF (ref 0–5)
GLUCOSE URINE: NEGATIVE MG/DL
HYALINE CASTS: 3 /LPF (ref 0–8)
KETONES, URINE: NEGATIVE MG/DL
LEUKOCYTE ESTERASE, URINE: ABNORMAL
MICROSCOPIC EXAMINATION: YES
NITRITE, URINE: POSITIVE
PH UA: 6 (ref 5–8)
PROTEIN UA: 100 MG/DL
RBC UA: 3 /HPF (ref 0–4)
SPECIFIC GRAVITY UA: 1.01 (ref 1–1.03)
URINE TYPE: ABNORMAL
UROBILINOGEN, URINE: 1 E.U./DL
WBC UA: 714 /HPF (ref 0–5)

## 2021-07-11 ENCOUNTER — TELEPHONE (OUTPATIENT)
Dept: INTERNAL MEDICINE CLINIC | Age: 81
End: 2021-07-11

## 2021-07-11 LAB — URINE CULTURE, ROUTINE: NORMAL

## 2021-07-11 NOTE — TELEPHONE ENCOUNTER
Patient called on call line secondary to concerns for UTI. Patient started to have symptoms of urethral spasm on Monday. She sent a message via [employer] about this on Thursday and went in for a urinalysis with culture on Friday. Urinalysis came back with large amount of blood, protein present, positive nitrates, large leukocyte esterase, WBCs of 714 and crystals. Urine culture came back with no growth. Patient was told to wait for results before being prescribed an antibiotic. It is of note the patient was on Pyridium when urinalysis was given. Patient states that today her urethral spasms are much improved. She denies any burning with urination currently. She states that she does have a little discomfort over the lower abdomen present currently. She denies any fevers or chills. Likely the patient's urinalysis is altered secondary to being on Pyridium when specimen was given. It is reassuring that urine culture came back negative. However, since patient is having continued symptoms I have advised her to take the nitrofurantoin that she has at home for today. She should call the office in the morning to speak with Dr. Vanna Mendoza about possible repeat urinalysis and culture off of pyridium.      The DO Sukh

## 2021-07-15 ENCOUNTER — PATIENT MESSAGE (OUTPATIENT)
Dept: INTERNAL MEDICINE CLINIC | Age: 81
End: 2021-07-15

## 2021-07-15 DIAGNOSIS — R82.90 ABNORMAL URINALYSIS: Primary | ICD-10-CM

## 2021-07-15 NOTE — TELEPHONE ENCOUNTER
From: Cristiano Styles  To: Eric Carpio MD  Sent: 7/15/2021 8:59 AM EDT  Subject: Non-Urgent Medical Question    Should I repeat the urine analysis in a couple of days?

## 2021-07-19 DIAGNOSIS — R82.90 ABNORMAL URINALYSIS: ICD-10-CM

## 2021-07-19 LAB
BILIRUBIN URINE: NEGATIVE
BLOOD, URINE: NEGATIVE
CLARITY: CLEAR
COLOR: YELLOW
EPITHELIAL CELLS, UA: 1 /HPF (ref 0–5)
GLUCOSE URINE: NEGATIVE MG/DL
HYALINE CASTS: 0 /LPF (ref 0–8)
KETONES, URINE: NEGATIVE MG/DL
LEUKOCYTE ESTERASE, URINE: ABNORMAL
MICROSCOPIC EXAMINATION: YES
NITRITE, URINE: NEGATIVE
PH UA: 7 (ref 5–8)
PROTEIN UA: NEGATIVE MG/DL
RBC UA: 1 /HPF (ref 0–4)
SPECIFIC GRAVITY UA: 1.01 (ref 1–1.03)
URINE TYPE: ABNORMAL
UROBILINOGEN, URINE: 0.2 E.U./DL
WBC UA: 10 /HPF (ref 0–5)

## 2021-07-20 LAB — URINE CULTURE, ROUTINE: NORMAL

## 2021-08-27 ENCOUNTER — TELEPHONE (OUTPATIENT)
Dept: INTERNAL MEDICINE CLINIC | Age: 81
End: 2021-08-27

## 2021-08-27 ENCOUNTER — HOSPITAL ENCOUNTER (OUTPATIENT)
Dept: CT IMAGING | Age: 81
Discharge: HOME | End: 2021-08-27
Payer: MEDICARE

## 2021-08-27 DIAGNOSIS — R91.8 GROUND GLASS OPACITY PRESENT ON IMAGING OF LUNG: ICD-10-CM

## 2021-08-27 DIAGNOSIS — R91.8 ABNORMAL CT SCAN OF LUNG: ICD-10-CM

## 2021-08-27 PROCEDURE — 71250 CT THORAX DX C-: CPT

## 2021-08-27 NOTE — TELEPHONE ENCOUNTER
Please call Phoebe Altman at [facility] [place] with dates/locations of patient's prior CT's. Thanks.

## 2021-08-30 DIAGNOSIS — R93.89 ABNORMAL CHEST CT: ICD-10-CM

## 2021-08-30 DIAGNOSIS — R91.8 PULMONARY NODULES: ICD-10-CM

## 2021-08-30 DIAGNOSIS — R91.8 GROUND GLASS OPACITY PRESENT ON IMAGING OF LUNG: Primary | ICD-10-CM

## 2021-09-05 DIAGNOSIS — I10 ESSENTIAL HYPERTENSION: ICD-10-CM

## 2021-09-07 RX ORDER — AMLODIPINE BESYLATE 5 MG/1
5 TABLET ORAL DAILY
Qty: 90 TABLET | Refills: 1 | Status: SHIPPED | OUTPATIENT
Start: 2021-09-07 | End: 2021-09-21 | Stop reason: DRUGHIGH

## 2021-09-08 DIAGNOSIS — E78.2 MIXED HYPERLIPIDEMIA: ICD-10-CM

## 2021-09-09 RX ORDER — ATORVASTATIN CALCIUM 40 MG/1
40 TABLET, FILM COATED ORAL DAILY
Qty: 90 TABLET | Refills: 1 | Status: SHIPPED | OUTPATIENT
Start: 2021-09-09 | End: 2021-10-04

## 2021-09-21 ENCOUNTER — OFFICE VISIT (OUTPATIENT)
Dept: INTERNAL MEDICINE CLINIC | Age: 81
End: 2021-09-21
Payer: MEDICARE

## 2021-09-21 VITALS
HEART RATE: 63 BPM | OXYGEN SATURATION: 98 % | WEIGHT: 150 LBS | BODY MASS INDEX: 26.57 KG/M2 | SYSTOLIC BLOOD PRESSURE: 146 MMHG | RESPIRATION RATE: 14 BRPM | DIASTOLIC BLOOD PRESSURE: 68 MMHG

## 2021-09-21 DIAGNOSIS — K86.89 PANCREATIC ATROPHY: ICD-10-CM

## 2021-09-21 DIAGNOSIS — C64.1 CARCINOMA, RENAL CELL, RIGHT (HCC): ICD-10-CM

## 2021-09-21 DIAGNOSIS — R91.8 GROUND GLASS OPACITY PRESENT ON IMAGING OF LUNG: ICD-10-CM

## 2021-09-21 DIAGNOSIS — I10 ESSENTIAL HYPERTENSION: Primary | ICD-10-CM

## 2021-09-21 PROCEDURE — 4040F PNEUMOC VAC/ADMIN/RCVD: CPT | Performed by: INTERNAL MEDICINE

## 2021-09-21 PROCEDURE — G8417 CALC BMI ABV UP PARAM F/U: HCPCS | Performed by: INTERNAL MEDICINE

## 2021-09-21 PROCEDURE — 99214 OFFICE O/P EST MOD 30 MIN: CPT | Performed by: INTERNAL MEDICINE

## 2021-09-21 PROCEDURE — G8399 PT W/DXA RESULTS DOCUMENT: HCPCS | Performed by: INTERNAL MEDICINE

## 2021-09-21 PROCEDURE — 1123F ACP DISCUSS/DSCN MKR DOCD: CPT | Performed by: INTERNAL MEDICINE

## 2021-09-21 PROCEDURE — 1036F TOBACCO NON-USER: CPT | Performed by: INTERNAL MEDICINE

## 2021-09-21 PROCEDURE — 1090F PRES/ABSN URINE INCON ASSESS: CPT | Performed by: INTERNAL MEDICINE

## 2021-09-21 PROCEDURE — G8427 DOCREV CUR MEDS BY ELIG CLIN: HCPCS | Performed by: INTERNAL MEDICINE

## 2021-09-21 RX ORDER — AMLODIPINE BESYLATE 5 MG/1
5 TABLET ORAL DAILY
Qty: 90 TABLET | Refills: 1 | Status: SHIPPED
Start: 2021-09-21 | End: 2021-12-06

## 2021-09-21 NOTE — PROGRESS NOTES
Mary Ellen Shin (:  1940) is a 80 y.o. female,Established patient, here for evaluation of the following chief complaint(s):  Follow-up      ASSESSMENT/PLAN:  1. Essential hypertension  Assessment & Plan:   Uncontrolled, changes made today: increase amlodipine to 7.5mg daily. , medication adherence emphasized and lifestyle modifications recommended. Previously had been well controlled on HCTZ however that medication was causing her significant frequent urination and UUI so was stopped. Orders:  -     amLODIPine (NORVASC) 5 MG tablet; Take 1 tablet by mouth daily (along with 2.5mg dose for total dose 7.5mg daily), Disp-90 tablet, R-1Adjust Sig  2. Ground glass opacity present on imaging of lung  Comments:  Asymptomatic. Discovered incidentally on imaging. Seeing pulmonary next week for further evaluation. 3. Pancreatic atrophy  Comments:  Asymptomatic. Discovered incidentally on imaging. Reassurance provided today. Will attempt to contact rads at [facility] for image comparison. 4. Carcinoma, renal cell, right [facility])  Assessment & Plan:   Monitored by specialist- no acute findings meriting change in the plan. [place] at [facility] with [place]. Return in about 4 weeks (around 10/19/2021) for  , HTN f/u.    SUBJECTIVE/OBJECTIVE:  HPI    Had a great trip to [place]. Bladder did well. No UTIs. Moderate pancreatic atrophy on Ct of the chest - no symptoms. No history of pancreatitis. She is concerned about this. She does not know of a trauma history either. 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 Has GGOs and increased reticulation on CT  Seeing pulmonary next week  Minimal chest symptoms. Just went on trip to [place] and did well. At home /60s. Review of Systems   Constitutional: Negative for chills, fatigue and fever. Respiratory: Negative for cough, shortness of breath and wheezing. Cardiovascular: Negative for chest pain, palpitations and leg swelling. Gastrointestinal: Negative for abdominal pain, diarrhea, nausea and vomiting. Current Outpatient Medications on File Prior to Visit   Medication Sig Dispense Refill    atorvastatin (LIPITOR) 40 MG tablet TAKE 1 TABLET BY MOUTH DAILY 90 tablet 1    amLODIPine (NORVASC) 5 MG tablet TAKE 1 TABLET BY MOUTH DAILY 90 tablet 1    folic acid-pyridoxine-cyancobalamin (FOLBIC) 2.5-25-2 MG TABS TAKE ONE TABLET BY MOUTH DAILY 90 tablet 3    Glucosamine-Chondroit-Vit C-Mn (GLUCOSAMINE 1500 COMPLEX) CAPS Take 1 tablet by mouth daily      aspirin 1 MG/ML SUSP Take 81 mg by mouth daily       No current facility-administered medications on file prior to visit. Vitals:    09/21/21 1051   BP: (!) 146/68   Pulse:    Resp:    SpO2:      Physical Exam  Constitutional:       General: She is not in acute distress. Appearance: Normal appearance. She is not diaphoretic. HENT:      Head: Normocephalic and atraumatic. Right Ear: External ear normal.      Left Ear: External ear normal.      Nose: Nose normal.      Mouth/Throat:      Mouth: Mucous membranes are moist.      Pharynx: Oropharynx is clear. Eyes:      General: No scleral icterus. Conjunctiva/sclera: Conjunctivae normal.   Cardiovascular:      Rate and Rhythm: Normal rate and regular rhythm. Pulses: Normal pulses. Heart sounds: Normal heart sounds. No murmur heard. No friction rub. No gallop. Pulmonary:      Effort: Pulmonary effort is normal.      Breath sounds: Normal breath sounds. No wheezing, rhonchi or rales. Abdominal:      General: Abdomen is flat. Bowel sounds are normal.   Musculoskeletal:         General: No deformity. Cervical back: Normal range of motion. Right lower leg: No edema. Left lower leg: No edema. Skin:     General: Skin is warm and dry. Findings: No rash. Neurological:      General: No focal deficit present. Mental Status: She is alert.       Coordination: Coordination normal. Gait: Gait normal.   Psychiatric:         Mood and Affect: Mood normal.         Behavior: Behavior normal.           On this date 9/21/2021 I have spent 30 minutes reviewing previous notes, test results and face to face with the patient discussing the diagnosis and importance of compliance with the treatment plan as well as documenting on the day of the visit. An electronic signature was used to authenticate this note.     --Deidra Garcia MD

## 2021-09-22 PROBLEM — R35.0 INCREASED FREQUENCY OF URINATION: Status: RESOLVED | Noted: 2020-01-10 | Resolved: 2021-09-22

## 2021-09-22 ASSESSMENT — ENCOUNTER SYMPTOMS
SHORTNESS OF BREATH: 0
WHEEZING: 0
COUGH: 0
DIARRHEA: 0
NAUSEA: 0
ABDOMINAL PAIN: 0
VOMITING: 0

## 2021-09-22 NOTE — ASSESSMENT & PLAN NOTE
Uncontrolled, changes made today: increase amlodipine to 7.5mg daily. , medication adherence emphasized and lifestyle modifications recommended. Previously had been well controlled on HCTZ however that medication was causing her significant frequent urination and UUI so was stopped.

## 2021-09-22 NOTE — ASSESSMENT & PLAN NOTE
Monitored by specialist- no acute findings meriting change in the plan. Following at [facility] with serial imaging.

## 2021-09-28 ENCOUNTER — OFFICE VISIT (OUTPATIENT)
Dept: PULMONOLOGY | Age: 81
End: 2021-09-28
Payer: MEDICARE

## 2021-09-28 VITALS
BODY MASS INDEX: 26.93 KG/M2 | OXYGEN SATURATION: 99 % | DIASTOLIC BLOOD PRESSURE: 70 MMHG | RESPIRATION RATE: 16 BRPM | HEART RATE: 61 BPM | WEIGHT: 152 LBS | TEMPERATURE: 97.1 F | HEIGHT: 63 IN | SYSTOLIC BLOOD PRESSURE: 144 MMHG

## 2021-09-28 DIAGNOSIS — R91.8 PULMONARY NODULES/LESIONS, MULTIPLE: Primary | ICD-10-CM

## 2021-09-28 PROCEDURE — 1123F ACP DISCUSS/DSCN MKR DOCD: CPT | Performed by: INTERNAL MEDICINE

## 2021-09-28 PROCEDURE — 99203 OFFICE O/P NEW LOW 30 MIN: CPT | Performed by: INTERNAL MEDICINE

## 2021-09-28 PROCEDURE — 1036F TOBACCO NON-USER: CPT | Performed by: INTERNAL MEDICINE

## 2021-09-28 PROCEDURE — G8427 DOCREV CUR MEDS BY ELIG CLIN: HCPCS | Performed by: INTERNAL MEDICINE

## 2021-09-28 PROCEDURE — 4040F PNEUMOC VAC/ADMIN/RCVD: CPT | Performed by: INTERNAL MEDICINE

## 2021-09-28 PROCEDURE — 1090F PRES/ABSN URINE INCON ASSESS: CPT | Performed by: INTERNAL MEDICINE

## 2021-09-28 PROCEDURE — G8399 PT W/DXA RESULTS DOCUMENT: HCPCS | Performed by: INTERNAL MEDICINE

## 2021-09-28 PROCEDURE — G8417 CALC BMI ABV UP PARAM F/U: HCPCS | Performed by: INTERNAL MEDICINE

## 2021-09-28 RX ORDER — MULTIVIT-MIN/IRON/FOLIC ACID/K 18-600-40
2000 CAPSULE ORAL
COMMUNITY

## 2021-09-28 NOTE — PROGRESS NOTES
[facility]    Outpatient Initial Note    Subjective:   Referring Physician: Humberto Cleveland / MARIANNA:     The patient is 80 y.o. female who presents today for a new patient visit for abnormal chest CT. 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 She quit smoking 41 years ago. She has a density on her right kidney that has been followed for the past year and a half by the [facility]. It is concerning for malignancy but has not changed in a year and she has not had a biopsy. She was under the impression that she had cancer cells in her kidney but it does not sound like that was actually ever confirmed. It was found when she had some hematuria. On her last abdominal CT to follow-up this lesion there was a comment about some groundglass nodules in the base of her lungs. This was followed up by a dedicated CT of her chest. The CT of her chest showed multiple groundglass nodules most of which were subcentimeter. There was a dominant nodule in her left upper lobe that was 1.2 x 1.1 cm roughly. It was groundglass in nature as well. She is very active and recently got back from [place] where she went zip lining and white water rafting with her 13year-old grandson.  Apparently it is her tradition that she takes her grandsons on a international trip when they are 13years old       Past Medical History:    Past Medical History:   Diagnosis Date    DVT (deep venous thrombosis) ([address] 2017    after left TKR    History of therapeutic radiation        Social History:    Social History     Tobacco Use   Smoking Status Former Smoker    Packs/day: 0.25    Years: 15.00    Pack years: 3.75    Types: Cigarettes    Quit date: 1980    Years since quittin.7   Smokeless Tobacco Former User   Tobacco Comment    stop smoking        Family History:  Family History   Problem Relation Age of Onset    Deep Vein Thrombosis Mother 35    Breast Cancer Daughter 44     Current Medications:  Current Outpatient Medications on File Prior to Visit   Medication Sig Dispense Refill    Cholecalciferol (VITAMIN D) 50 MCG (2000 UT) CAPS capsule Take 2,000 Units by mouth      amLODIPine (NORVASC) 5 MG tablet Take 1 tablet by mouth daily (along with 2.5mg dose for total dose 7.5mg daily) 90 tablet 1    atorvastatin (LIPITOR) 40 MG tablet TAKE 1 TABLET BY MOUTH DAILY 90 tablet 1    folic acid-pyridoxine-cyancobalamin (FOLBIC) 2.5-25-2 MG TABS TAKE ONE TABLET BY MOUTH DAILY 90 tablet 3    Glucosamine-Chondroit-Vit C-Mn (GLUCOSAMINE 1500 COMPLEX) CAPS Take 1 tablet by mouth daily      aspirin 1 MG/ML SUSP Take 81 mg by mouth daily       No current facility-administered medications on file prior to visit. Allergies:   Allergies   Allergen Reactions    Atenolol      Avoid beta blockers; sino atrial node dysfunction Dr Carly Carroll 12/2013    Sulfa Antibiotics Nausea And Vomiting     Dizziness    Lisinopril Swelling    Meperidine Nausea And Vomiting     Lightheadness       REVIEW OF SYSTEMS:    CONSTITUTIONAL: Negative for fevers and chills  HEENT: Negative for oropharyngeal exudate, post nasal drip, sinus pain / pressure, nasal congestion, ear pain  RESPIRATORY:  See HPI  CARDIOVASCULAR: Negative for chest pain, palpitations, edema  GASTROINTESTINAL: Negative for nausea, vomiting, diarrhea, constipation and abdominal pain  HEMATOLOGICAL: Negative for adenopathy  SKIN: Negative for clubbing, cyanosis, skin lesions  EXTREMITIES: Negative for weakness, decreased ROM  NEUROLOGICAL: Negative for unilateral weakness, speech or gait abnormalities  PSYCH: Negative for anxiety, depression    Objective:   PHYSICAL EXAM:        VITALS:  BP (!) 144/70 (Site: Right Upper Arm, Position: Sitting, Cuff Size: Medium Adult)   Pulse 61   Temp 97.1 °F (36.2 °C) (Infrared)   Resp 16   Ht 5' 3\" (1.6 m)   Wt 152 lb (68.9 kg)   SpO2 99%   Breastfeeding No   BMI 26.93 kg/m²     CONSTITUTIONAL:  Awake, alert, cooperative, no apparent distress, and appears stated age  [de-identified]: No oropharyngeal exudate, PERRL, no cervical adenopathy, no tracheal deviation, thyroid size normal  LUNGS:  No increased work of breathing and clear to auscultation, no crackles or wheezing   CARDIOVASCULAR:  normal S1 and S2 and no JVD  ABDOMEN:  Normal bowel sounds, non-distended and non-tender to palpation  EXT: No edema, no calf tenderness. Pulses are present bilaterally. NEUROLOGIC:  Mental Status Exam:  Level of Alertness:   awake  Orientation:   person, place, time. SKIN:  normal skin color, texture, turgor, no redness, warmth, or swelling     DATA:      Radiology Review:  Pertinent images / reports were reviewed as a part of this visit. CT chest reveals the following: August 2021  Impression   1. A few groundglass nodules in the bilateral upper and left lower lobes   measure up to 1.2 cm x 0.9 cm, although most are 0.5 cm or less.  Recommend   follow-up chest CT in 3-6 months as below unless there are prior outside   studies available for comparison. 2. 0.3 cm solid nodules in the subpleural left upper lobe are more likely   benign sequelae of an infectious or inflammatory process or intrapulmonary   lymph nodes as opposed to neoplastic.  Consider optional follow-up chest CT   in 1 year as below unless there are prior outside studies to establish at   least 1 year of stability. 3. Minimal subpleural, basilar predominant interstitial fibrotic changes of   indeterminate pattern. 4. A few additional incidental findings as above for which no dedicated   follow-up is recommended.       RECOMMENDATIONS:   Multiple pulmonary nodules. Most severe: 11 mm ground-glass pulmonary nodule   within the upper lobe. Recommend a non-contrast Chest CT at 3-6 months. Subsequent management based on the most suspicious nodule(s). Last PFTs: None on file    Immunizations:   Immunization History   Administered Date(s) Administered    COVID-19, Moderna, PF, 100mcg/0.5mL 01/19/2021, 02/17/2021    Hepatitis A Ped/Adol (Havrix, Vaqta) 11/20/2009, 05/25/2010    Influenza Virus Vaccine 10/01/2019    Influenza, High Dose (Fluzone 65 yrs and older) 09/08/2020    Influenza, High-dose, Quadv, 65 yrs +, IM (Fluzone) 09/13/2021    Pneumococcal Conjugate 13-valent (Mylyoth46) 11/23/2014    Pneumococcal Polysaccharide (Ngiigomla96) 02/01/2005, 12/19/2012    Td (Adult), 5 Lf Tetanus Toxoid, Pf (Tenivac, Decavac) 11/01/2009    Tdap (Boostrix, Adacel) 07/31/2020    Typhoid Vi capsular polysaccharide (Typhim VI) 11/02/2009    Yellow Fever (YF-Vax) 11/20/2009    Zoster Live (Zostavax) 04/01/2007    Zoster Recombinant (Shingrix) 08/01/2018, 10/03/2018, 10/24/2018       Assessment: This is a 80 y.o. female with incidentally identified groundglass pulmonary nodules    Plan:   -Patient's groundglass nodules are nonspecific in nature but do need to be followed given the size of the dominant lung. Given her low risk for malignancy I think a 6-month follow-up is in keeping with the guidelines. Because they are groundglass in nature we will have to follow him for a longer period, assuming did not resolve spontaneously. She speaks as if she has a renal cell cancer of some kind but it does not appear that that is definitively the case. If it were these lesions would be considered higher risk and would warrant earlier follow-up. If they are stable for 6 months I will get another scan 6 months after that. Then annual scans until they have either resolved or been stable for 5 years.  -   No orders of the defined types were placed in this encounter. Diagnosis Orders   1. Pulmonary nodules/lesions, multiple  CT CHEST WO CONTRAST      - Tobacco use:    The patient is not currently smoking.        - RTC 5 months w/ MD. Call or RTC sooner if symptoms persist or worsen acutely.

## 2021-10-01 DIAGNOSIS — E78.2 MIXED HYPERLIPIDEMIA: ICD-10-CM

## 2021-10-04 RX ORDER — ATORVASTATIN CALCIUM 40 MG/1
40 TABLET, FILM COATED ORAL DAILY
Qty: 90 TABLET | Refills: 1 | Status: SHIPPED | OUTPATIENT
Start: 2021-10-04 | End: 2022-04-04

## 2021-11-17 ENCOUNTER — HOSPITAL ENCOUNTER (OUTPATIENT)
Dept: NEUROLOGY | Age: 81
Discharge: HOME | End: 2021-11-17
Payer: MEDICARE

## 2021-11-17 DIAGNOSIS — R20.2 PARESTHESIA OF SKIN: ICD-10-CM

## 2021-11-17 PROCEDURE — 95886 MUSC TEST DONE W/N TEST COMP: CPT

## 2021-11-17 PROCEDURE — 95908 NRV CNDJ TST 3-4 STUDIES: CPT | Performed by: PSYCHIATRY & NEUROLOGY

## 2021-11-17 PROCEDURE — 95908 NRV CNDJ TST 3-4 STUDIES: CPT

## 2021-11-17 PROCEDURE — 95886 MUSC TEST DONE W/N TEST COMP: CPT | Performed by: PSYCHIATRY & NEUROLOGY

## 2021-11-17 NOTE — PROCEDURES
[facility]                     [address] [facility], [address]                             ELECTROMYOGRAM REPORT    PATIENT NAME: Jazmine BOURNE                  :        1940  MED REC NO:   [id number]                          ROOM:  ACCOUNT NO:   [de-identified]                           ADMIT DATE: 2021  PROVIDER:     Brittney Partida MD    DATE OF EM2021    REASON FOR EMG:  The patient complains of numbness in the right leg. SUMMARY:  The right peroneal motor nerve study had a low amplitude. The  right posterior tibial motor nerve study was normal.  The right sural  and superficial peroneal sensory nerve studies were not recordable. Needle EMG of several muscles in the right lower extremity was normal.   The patient declined EMG of the left leg. 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         Elvia Toscano MD    D: 2021 13:40:48       T: 2021 13:43:09     [email or web address]  Job#: [id number]     Doc#: [id number]    CC:

## 2021-11-29 DIAGNOSIS — I10 ESSENTIAL HYPERTENSION: ICD-10-CM

## 2021-11-29 RX ORDER — AMLODIPINE BESYLATE 2.5 MG/1
2.5 TABLET ORAL DAILY
Qty: 90 TABLET | Refills: 1 | Status: SHIPPED | OUTPATIENT
Start: 2021-11-29 | End: 2021-12-16

## 2021-12-06 DIAGNOSIS — I10 ESSENTIAL HYPERTENSION: ICD-10-CM

## 2021-12-06 RX ORDER — AMLODIPINE BESYLATE 5 MG/1
TABLET ORAL
Qty: 90 TABLET | Refills: 1 | Status: SHIPPED | OUTPATIENT
Start: 2021-12-06

## 2021-12-16 ENCOUNTER — TELEMEDICINE (OUTPATIENT)
Dept: INTERNAL MEDICINE CLINIC | Age: 81
End: 2021-12-16
Payer: MEDICARE

## 2021-12-16 DIAGNOSIS — I10 ESSENTIAL HYPERTENSION: ICD-10-CM

## 2021-12-16 DIAGNOSIS — J01.90 ACUTE SINUSITIS WITH SYMPTOMS > 10 DAYS: ICD-10-CM

## 2021-12-16 DIAGNOSIS — J01.00 ACUTE NON-RECURRENT MAXILLARY SINUSITIS: Primary | ICD-10-CM

## 2021-12-16 LAB — SARS-COV-2: NOT DETECTED

## 2021-12-16 PROCEDURE — 99214 OFFICE O/P EST MOD 30 MIN: CPT | Performed by: INTERNAL MEDICINE

## 2021-12-16 PROCEDURE — G8427 DOCREV CUR MEDS BY ELIG CLIN: HCPCS | Performed by: INTERNAL MEDICINE

## 2021-12-16 PROCEDURE — G8399 PT W/DXA RESULTS DOCUMENT: HCPCS | Performed by: INTERNAL MEDICINE

## 2021-12-16 PROCEDURE — 1123F ACP DISCUSS/DSCN MKR DOCD: CPT | Performed by: INTERNAL MEDICINE

## 2021-12-16 PROCEDURE — 4040F PNEUMOC VAC/ADMIN/RCVD: CPT | Performed by: INTERNAL MEDICINE

## 2021-12-16 PROCEDURE — 1090F PRES/ABSN URINE INCON ASSESS: CPT | Performed by: INTERNAL MEDICINE

## 2021-12-16 RX ORDER — AMOXICILLIN AND CLAVULANATE POTASSIUM 875; 125 MG/1; MG/1
1 TABLET, FILM COATED ORAL 2 TIMES DAILY
Qty: 14 TABLET | Refills: 0 | Status: SHIPPED | OUTPATIENT
Start: 2021-12-16 | End: 2021-12-23

## 2021-12-16 ASSESSMENT — ENCOUNTER SYMPTOMS
COUGH: 1
SINUS PAIN: 1
RHINORRHEA: 1
SHORTNESS OF BREATH: 0
SINUS PRESSURE: 1
NAUSEA: 0
ABDOMINAL PAIN: 0
DIARRHEA: 0
WHEEZING: 0
VOMITING: 0
VOICE CHANGE: 1

## 2021-12-16 NOTE — PROGRESS NOTES
Rachel Hilliard (:  1940) is a 80 y.o. female,Established patient, here for evaluation of the following chief complaint(s):   Hypertension      ASSESSMENT/PLAN:  1. Acute non-recurrent maxillary sinusitis  Comments:  8 days of sinus sxs has worsened over the last day or two. COVID testing today. Continue mucinex. Rx augmentin for coverage of bacterial sinusitis. Orders:  -     COVID-19; Future  -     amoxicillin-clavulanate (AUGMENTIN) 875-125 MG per tablet; Take 1 tablet by mouth 2 times daily for 7 days, Disp-14 tablet, R-0Normal  2. Acute sinusitis with symptoms > 10 days  -     amoxicillin-clavulanate (AUGMENTIN) 875-125 MG per tablet; Take 1 tablet by mouth 2 times daily for 7 days, Disp-14 tablet, R-0Normal  3. Essential hypertension  Assessment & Plan:  BP has been at goal at home however having some leg heaviness and slight swelling of the ankles on the amlodipine 7.5mg. We will decrease back to 5mg and possibly add back small diuretic dose (stopped 2/2 frequent urination prior to her trip to [place]). Close follow up. Return in about 8 weeks (around 2/10/2022) for HTN. SUBJECTIVE/OBJECTIVE:  HPI    Seen in parking lot -     Last Thursday attended a party and then got laryngitis. More sinus congestion developed and then started with facial pressure and postnasal drip. Was taking mucinex with good effect but then has worsened over last day. Drainage thicker. More pressure. No fevers or chills. No SOB. Mild cough. No loss of sense of taste or smell. No GI sxs. BP at home 508-797 systolic on amlodipine 8.3LT daily. More leg swelling and heaviness however with dose increase. Previously tolerated 5mg well. Review of Systems   Constitutional: Positive for fatigue. Negative for chills and fever. HENT: Positive for congestion, postnasal drip, rhinorrhea, sinus pressure, sinus pain and voice change. Negative for ear pain. Respiratory: Positive for cough.  Negative for shortness of breath and wheezing. Cardiovascular: Negative for chest pain, palpitations and leg swelling. Gastrointestinal: Negative for abdominal pain, diarrhea, nausea and vomiting. Current Outpatient Medications on File Prior to Visit   Medication Sig Dispense Refill    amLODIPine (NORVASC) 5 MG tablet TAKE 1 TABLET BY MOUTH DAILY 90 tablet 1    atorvastatin (LIPITOR) 40 MG tablet TAKE 1 TABLET BY MOUTH DAILY 90 tablet 1    Cholecalciferol (VITAMIN D) 50 MCG (2000 UT) CAPS capsule Take 2,000 Units by mouth       folic acid-pyridoxine-cyancobalamin (FOLBIC) 2.5-25-2 MG TABS TAKE ONE TABLET BY MOUTH DAILY 90 tablet 3    Glucosamine-Chondroit-Vit C-Mn (GLUCOSAMINE 1500 COMPLEX) CAPS Take 1 tablet by mouth daily       aspirin 1 MG/ML SUSP Take 81 mg by mouth daily        No current facility-administered medications on file prior to visit. No flowsheet data found. General - well developed, well nourished, NAD  Mental status - Awake and alert, Oriented x 3. Follows commands. Eyes - EOMI, Sclera normal, No conjunctival discharge or injection  HENT: NCAT, MMM. Normal external ears  Neck - no visualized masses, nl ROM  Pulmonary/Chest - Speaking in full sentences, effort normal, no distress. MSK - Normal gait with no signs of ataxia. Neuro - No facial asymmetry, no gaze palzy  Skin - no rashes or discoloration of facial skin  Psych - nl appearance and grooming, affect appropriate to mood, no hallucinations  Other pertinent observable physical exam findings-           On this date 12/16/2021 I have spent 30 minutes reviewing previous notes, test results and face to face (virtual) with the patient discussing the diagnosis and importance of compliance with the treatment plan as well as documenting on the day of the visit. Judson Lugo is a 80 y.o. female being evaluated by a Virtual Visit (video visit) encounter to address concerns as mentioned above.   A caregiver was present when appropriate. Due to this being a TeleHealth encounter (During LDYII-19 public health emergency), evaluation of the following organ systems was limited: Vitals/Constitutional/EENT/Resp/CV/GI//MS/Neuro/Skin/Heme-Lymph-Imm. Pursuant to the emergency declaration under the [address] [place] [address], [address] [place] [address] and the [employer] and [employer] Act, this Virtual Visit was conducted with patient's (and/or legal guardian's) consent, to reduce the patient's risk of exposure to COVID-19 and provide necessary medical care. The patient (and/or legal guardian) has also been advised to contact this office for worsening conditions or problems, and seek emergency medical treatment and/or call 911 if deemed necessary. Patient identification was verified at the start of the visit: Yes    Services were provided through a video synchronous discussion virtually to substitute for in-person clinic visit. Patient was located at home and provider was located in office or at home. An electronic signature was used to authenticate this note.     --Laurel Oates MD

## 2021-12-16 NOTE — ASSESSMENT & PLAN NOTE
BP has been at goal at home however having some leg heaviness and slight swelling of the ankles on the amlodipine 7.5mg. We will decrease back to 5mg and possibly add back small diuretic dose (stopped 2/2 frequent urination prior to her trip to [place]). Close follow up.

## 2022-02-02 ENCOUNTER — HOSPITAL ENCOUNTER (OUTPATIENT)
Dept: CT IMAGING | Age: 82
Discharge: HOME | End: 2022-02-02
Payer: MEDICARE

## 2022-02-02 DIAGNOSIS — R91.8 PULMONARY NODULES/LESIONS, MULTIPLE: ICD-10-CM

## 2022-02-02 PROCEDURE — 71250 CT THORAX DX C-: CPT

## 2022-02-10 ENCOUNTER — PATIENT MESSAGE (OUTPATIENT)
Dept: INTERNAL MEDICINE CLINIC | Age: 82
End: 2022-02-10

## 2022-02-10 RX ORDER — FOLIC ACID-PYRIDOXINE-CYANOCOBALAMIN TAB 2.5-25-2 MG 2.5-25-2 MG
TAB ORAL
Qty: 90 TABLET | Refills: 3 | Status: SHIPPED | OUTPATIENT
Start: 2022-02-10 | End: 2022-07-14 | Stop reason: ALTCHOICE

## 2022-02-10 NOTE — TELEPHONE ENCOUNTER
From: Pastor Garcia  To: Dr. Severiano Reynolds: 2/10/2022 2:35 PM EST  Subject: change in pharmacy    Attention Jj Peters  Would you please delete the [address] from my record and send my prescription for   Folbic 90 day supply to :  [address] [place] [address]. [facility] (phone [phone])  Please let [facility] know I will be using [facility], not insurance. Thank you.

## 2022-02-15 ENCOUNTER — OFFICE VISIT (OUTPATIENT)
Dept: INTERNAL MEDICINE CLINIC | Age: 82
End: 2022-02-15
Payer: MEDICARE

## 2022-02-15 VITALS
WEIGHT: 155 LBS | SYSTOLIC BLOOD PRESSURE: 138 MMHG | OXYGEN SATURATION: 99 % | HEART RATE: 63 BPM | DIASTOLIC BLOOD PRESSURE: 68 MMHG | BODY MASS INDEX: 27.46 KG/M2 | TEMPERATURE: 95.7 F

## 2022-02-15 DIAGNOSIS — Z13.6 ENCOUNTER FOR LIPID SCREENING FOR CARDIOVASCULAR DISEASE: ICD-10-CM

## 2022-02-15 DIAGNOSIS — R09.82 POSTNASAL DRIP: ICD-10-CM

## 2022-02-15 DIAGNOSIS — E55.9 VITAMIN D DEFICIENCY: ICD-10-CM

## 2022-02-15 DIAGNOSIS — Z13.220 ENCOUNTER FOR LIPID SCREENING FOR CARDIOVASCULAR DISEASE: ICD-10-CM

## 2022-02-15 DIAGNOSIS — R09.82 POSTNASAL DRIP: Primary | ICD-10-CM

## 2022-02-15 DIAGNOSIS — I10 ESSENTIAL HYPERTENSION: ICD-10-CM

## 2022-02-15 PROBLEM — N39.41 URGE INCONTINENCE: Status: ACTIVE | Noted: 2021-03-03

## 2022-02-15 PROBLEM — M85.89 OSTEOPENIA OF MULTIPLE SITES: Status: ACTIVE | Noted: 2020-03-26

## 2022-02-15 PROBLEM — I34.1 MVP (MITRAL VALVE PROLAPSE): Status: RESOLVED | Noted: 2020-03-26 | Resolved: 2022-02-15

## 2022-02-15 PROBLEM — G90.529 REFLEX SYMPATHETIC DYSTROPHY OF LOWER LIMB: Status: RESOLVED | Noted: 2017-04-05 | Resolved: 2022-02-15

## 2022-02-15 PROCEDURE — 1123F ACP DISCUSS/DSCN MKR DOCD: CPT | Performed by: INTERNAL MEDICINE

## 2022-02-15 PROCEDURE — G8484 FLU IMMUNIZE NO ADMIN: HCPCS | Performed by: INTERNAL MEDICINE

## 2022-02-15 PROCEDURE — G8399 PT W/DXA RESULTS DOCUMENT: HCPCS | Performed by: INTERNAL MEDICINE

## 2022-02-15 PROCEDURE — 4040F PNEUMOC VAC/ADMIN/RCVD: CPT | Performed by: INTERNAL MEDICINE

## 2022-02-15 PROCEDURE — 1036F TOBACCO NON-USER: CPT | Performed by: INTERNAL MEDICINE

## 2022-02-15 PROCEDURE — G8427 DOCREV CUR MEDS BY ELIG CLIN: HCPCS | Performed by: INTERNAL MEDICINE

## 2022-02-15 PROCEDURE — 99214 OFFICE O/P EST MOD 30 MIN: CPT | Performed by: INTERNAL MEDICINE

## 2022-02-15 PROCEDURE — G8417 CALC BMI ABV UP PARAM F/U: HCPCS | Performed by: INTERNAL MEDICINE

## 2022-02-15 PROCEDURE — 1090F PRES/ABSN URINE INCON ASSESS: CPT | Performed by: INTERNAL MEDICINE

## 2022-02-15 RX ORDER — FLUTICASONE PROPIONATE 50 MCG
SPRAY, SUSPENSION (ML) NASAL
Qty: 48 G | Refills: 1 | Status: SHIPPED | OUTPATIENT
Start: 2022-02-15

## 2022-02-15 RX ORDER — FLUTICASONE PROPIONATE 50 MCG
2 SPRAY, SUSPENSION (ML) NASAL DAILY
Qty: 16 G | Refills: 5 | Status: SHIPPED | OUTPATIENT
Start: 2022-02-15 | End: 2022-02-15

## 2022-02-15 ASSESSMENT — PATIENT HEALTH QUESTIONNAIRE - PHQ9
SUM OF ALL RESPONSES TO PHQ QUESTIONS 1-9: 0
SUM OF ALL RESPONSES TO PHQ QUESTIONS 1-9: 0
1. LITTLE INTEREST OR PLEASURE IN DOING THINGS: 0
SUM OF ALL RESPONSES TO PHQ QUESTIONS 1-9: 0
SUM OF ALL RESPONSES TO PHQ QUESTIONS 1-9: 0
SUM OF ALL RESPONSES TO PHQ9 QUESTIONS 1 & 2: 0
2. FEELING DOWN, DEPRESSED OR HOPELESS: 0

## 2022-02-15 NOTE — PROGRESS NOTES
Sydney Sparks (:  1940) is a 80 y.o. female,Established patient, here for evaluation of the following chief complaint(s):  Hypertension      ASSESSMENT/PLAN:  1. Postnasal drip  Comments:  Continue flonase prn.   2. Encounter for lipid screening for cardiovascular disease  -     Lipid Panel; Future  3. Vitamin D deficiency  -     Vitamin D 25 Hydroxy; Future  4. Essential hypertension  Assessment & Plan:  Borderline controlled. Some variability here in the office. Home cuff likely not accurate. She will get a new cuff and monitor at home. Continue amlodipine 5mg daily. Hesitate to add back HCTZ as has so much urinary frequency already. Had angioedema with ACE-I but could consider ARB. She will work on lifestyle modifications - lowering sodium intake and more exercise. Orders:  -     Comprehensive Metabolic Panel; Future  -     CBC Auto Differential; Future      Return in about 3 months (around 5/15/2022) for AWV, chronic conditions. SUBJECTIVE/OBJECTIVE:  HPI    Blood pressure at home mostly 130s/70s. Unsure whether her cuff is accurate. Wants to get a new cuff. Wants to discuss ASA. Using flonase for postnasal drip which is helping significantly. Review of Systems    Current Outpatient Medications on File Prior to Visit   Medication Sig Dispense Refill    folic acid-pyridoxine-cyancobalamin (FOLBIC) 2.5-25-2 MG TABS TAKE ONE TABLET BY MOUTH DAILY (Patient taking differently: TAKE ONE TABLET BY MOUTH DAILY.  [facility]) 90 tablet 3    amLODIPine (NORVASC) 5 MG tablet TAKE 1 TABLET BY MOUTH DAILY 90 tablet 1    atorvastatin (LIPITOR) 40 MG tablet TAKE 1 TABLET BY MOUTH DAILY 90 tablet 1    Cholecalciferol (VITAMIN D) 50 MCG ( UT) CAPS capsule Take 2,000 Units by mouth       Glucosamine-Chondroit-Vit C-Mn (GLUCOSAMINE 1500 COMPLEX) CAPS Take 1 tablet by mouth daily       aspirin 1 MG/ML SUSP Take 81 mg by mouth daily        No current facility-administered medications on file prior to visit. Vitals:    02/15/22 1017   BP: (!) 144/68   Pulse:    Temp:    SpO2:      Physical Exam  Constitutional:       General: She is not in acute distress. Appearance: Normal appearance. She is not diaphoretic. HENT:      Head: Normocephalic and atraumatic. Right Ear: External ear normal.      Left Ear: External ear normal.      Nose: Nose normal.      Mouth/Throat:      Mouth: Mucous membranes are moist.      Pharynx: Oropharynx is clear. Eyes:      General: No scleral icterus. Conjunctiva/sclera: Conjunctivae normal.   Cardiovascular:      Rate and Rhythm: Normal rate and regular rhythm. Pulses: Normal pulses. Heart sounds: Normal heart sounds. No murmur heard. No friction rub. No gallop. Pulmonary:      Effort: Pulmonary effort is normal.      Breath sounds: Normal breath sounds. No wheezing, rhonchi or rales. Abdominal:      General: Abdomen is flat. Bowel sounds are normal.   Musculoskeletal:         General: No deformity. Cervical back: Normal range of motion. Right lower leg: No edema. Left lower leg: No edema. Skin:     General: Skin is warm and dry. Findings: No rash. Neurological:      General: No focal deficit present. Mental Status: She is alert. Coordination: Coordination normal.      Gait: Gait normal.   Psychiatric:         Mood and Affect: Mood normal.         Behavior: Behavior normal.           On this date 2/15/2022 I have spent 35 minutes reviewing previous notes, test results and face to face with the patient discussing the diagnosis and importance of compliance with the treatment plan as well as documenting on the day of the visit. An electronic signature was used to authenticate this note.     --Syd Galeano MD

## 2022-02-15 NOTE — ASSESSMENT & PLAN NOTE
Borderline controlled. Some variability here in the office. Home cuff likely not accurate. She will get a new cuff and monitor at home. Continue amlodipine 5mg daily. Hesitate to add back HCTZ as has so much urinary frequency already. Had angioedema with ACE-I but could consider ARB. She will work on lifestyle modifications - lowering sodium intake and more exercise.

## 2022-02-28 ENCOUNTER — TELEPHONE (OUTPATIENT)
Dept: INTERNAL MEDICINE CLINIC | Age: 82
End: 2022-02-28

## 2022-02-28 DIAGNOSIS — R30.0 DYSURIA: Primary | ICD-10-CM

## 2022-02-28 NOTE — TELEPHONE ENCOUNTER
Patient is calling stating that she has been feeling UTI kamini for two days having urinary frequency, urethral spasms. Cloudy urine very small amounts when she goes. She would like to know if she could have an order for a urinalysis placed.

## 2022-03-01 DIAGNOSIS — R30.0 DYSURIA: ICD-10-CM

## 2022-03-01 LAB
BILIRUBIN URINE: NEGATIVE
BLOOD, URINE: NEGATIVE
CLARITY: CLEAR
COLOR: YELLOW
EPITHELIAL CELLS, UA: 1 /HPF (ref 0–5)
GLUCOSE URINE: NEGATIVE MG/DL
HYALINE CASTS: 0 /LPF (ref 0–8)
KETONES, URINE: NEGATIVE MG/DL
LEUKOCYTE ESTERASE, URINE: NEGATIVE
MICROSCOPIC EXAMINATION: NORMAL
NITRITE, URINE: NEGATIVE
PH UA: 7.5 (ref 5–8)
PROTEIN UA: NEGATIVE MG/DL
RBC UA: 1 /HPF (ref 0–4)
SPECIFIC GRAVITY UA: 1.01 (ref 1–1.03)
URINE TYPE: NORMAL
UROBILINOGEN, URINE: 0.2 E.U./DL
WBC UA: 0 /HPF (ref 0–5)

## 2022-03-02 LAB — URINE CULTURE, ROUTINE: NORMAL

## 2022-03-03 ENCOUNTER — PATIENT MESSAGE (OUTPATIENT)
Dept: INTERNAL MEDICINE CLINIC | Age: 82
End: 2022-03-03

## 2022-03-03 DIAGNOSIS — R30.0 DYSURIA: Primary | ICD-10-CM

## 2022-03-03 NOTE — TELEPHONE ENCOUNTER
From: Ira Vogt  To: Dr. Desai Loss: 3/3/2022 2:04 PM EST  Subject: Urologist    I also was thinking about a urologist visit. I had been going to Shruti Bullock, but she left [facility]. She didn't have anything to offer other than insertion of the device, which I mentioned I did not want. For a couple of reasons, I lacked confidence in her surgical experience. Of course, the last bladder surgery I had was in 2004 by a fantastic surgeon, Dr. Nanette Bence, at [facility]. He did a \"urethral taping\" which had great results for many years. Do you have any suggestions for a good urologist? I could live with this problem if need be, but it does limit my long hikes. And I think I might have a couple more international adventures I'd like to take. Thanks.

## 2022-03-16 ENCOUNTER — OFFICE VISIT (OUTPATIENT)
Dept: PULMONOLOGY | Age: 82
End: 2022-03-16
Payer: MEDICARE

## 2022-03-16 VITALS
WEIGHT: 155 LBS | HEIGHT: 63 IN | BODY MASS INDEX: 27.46 KG/M2 | DIASTOLIC BLOOD PRESSURE: 74 MMHG | TEMPERATURE: 96.5 F | OXYGEN SATURATION: 97 % | HEART RATE: 66 BPM | RESPIRATION RATE: 16 BRPM | SYSTOLIC BLOOD PRESSURE: 138 MMHG

## 2022-03-16 DIAGNOSIS — R91.1 PULMONARY NODULE: Primary | ICD-10-CM

## 2022-03-16 PROCEDURE — 4040F PNEUMOC VAC/ADMIN/RCVD: CPT | Performed by: INTERNAL MEDICINE

## 2022-03-16 PROCEDURE — 1036F TOBACCO NON-USER: CPT | Performed by: INTERNAL MEDICINE

## 2022-03-16 PROCEDURE — 1090F PRES/ABSN URINE INCON ASSESS: CPT | Performed by: INTERNAL MEDICINE

## 2022-03-16 PROCEDURE — 99212 OFFICE O/P EST SF 10 MIN: CPT | Performed by: INTERNAL MEDICINE

## 2022-03-16 PROCEDURE — G8417 CALC BMI ABV UP PARAM F/U: HCPCS | Performed by: INTERNAL MEDICINE

## 2022-03-16 PROCEDURE — G8427 DOCREV CUR MEDS BY ELIG CLIN: HCPCS | Performed by: INTERNAL MEDICINE

## 2022-03-16 PROCEDURE — 1123F ACP DISCUSS/DSCN MKR DOCD: CPT | Performed by: INTERNAL MEDICINE

## 2022-03-16 PROCEDURE — G8399 PT W/DXA RESULTS DOCUMENT: HCPCS | Performed by: INTERNAL MEDICINE

## 2022-03-16 PROCEDURE — G8484 FLU IMMUNIZE NO ADMIN: HCPCS | Performed by: INTERNAL MEDICINE

## 2022-03-16 NOTE — PROGRESS NOTES
[facility] Pulmonary and Critical Care    Outpatient Initial Note    Subjective:   Referring Physician: Fern Gray / HPI:     The patient is 80 y.o. female who presents today for a follow-up visit for abnormal chest CT. Ronda Zarco comes in today without any acute complaints. She was a little emotional as she just watched the statement by the President of [employer]. She is not having any respiratory issues and she got her follow-up CT last month. 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 She quit smoking 41 years ago. She has a density on her right kidney that has been followed for the past year and a half by the [facility]. It is concerning for malignancy but has not changed in a year and she has not had a biopsy. She was under the impression that she had cancer cells in her kidney but it does not sound like that was actually ever confirmed. It was found when she had some hematuria. On her last abdominal CT to follow-up this lesion there was a comment about some groundglass nodules in the base of her lungs. This was followed up by a dedicated CT of her chest. The CT of her chest showed multiple groundglass nodules most of which were subcentimeter. There was a dominant nodule in her left upper lobe that was 1.2 x 1.1 cm roughly. It was groundglass in nature as well. She is very active and recently got back from [place] where she went zip lining and white water rafting with her 13year-old grandson.  Apparently it is her tradition that she takes her grandsons on a international trip when they are 13years old       Past Medical History:    Past Medical History:   Diagnosis Date    DVT (deep venous thrombosis) ([facility] [address] 2017    after left TKR    History of therapeutic radiation        Social History:    Social History     Tobacco Use   Smoking Status Former Smoker  Packs/day: 0.25    Years: 15.00    Pack years: 3.75    Types: Cigarettes    Quit date: 1980    Years since quittin.2   Smokeless Tobacco Former User   Tobacco Comment    stop smoking        Family History:  Family History   Problem Relation Age of Onset    Deep Vein Thrombosis Mother 35    Stroke Brother         hemorrhagic    Breast Cancer Daughter 44     Current Medications:  Current Outpatient Medications on File Prior to Visit   Medication Sig Dispense Refill    fluticasone (FLONASE) 50 MCG/ACT nasal spray SHAKE LIQUID AND USE 2 SPRAYS IN EACH NOSTRIL DAILY 48 g 1    amLODIPine (NORVASC) 5 MG tablet TAKE 1 TABLET BY MOUTH DAILY 90 tablet 1    atorvastatin (LIPITOR) 40 MG tablet TAKE 1 TABLET BY MOUTH DAILY 90 tablet 1    Cholecalciferol (VITAMIN D) 50 MCG (2000 UT) CAPS capsule Take 2,000 Units by mouth       Glucosamine-Chondroit-Vit C-Mn (GLUCOSAMINE 1500 COMPLEX) CAPS Take 1 tablet by mouth daily       folic acid-pyridoxine-cyancobalamin (FOLBIC) 2.5-25-2 MG TABS TAKE ONE TABLET BY MOUTH DAILY (Patient not taking: Reported on 3/16/2022) 90 tablet 3     No current facility-administered medications on file prior to visit. Allergies:   Allergies   Allergen Reactions    Atenolol      Avoid beta blockers; sino atrial node dysfunction Dr Fatoumata Morrissey 2013    Sulfa Antibiotics Nausea And Vomiting     Dizziness    Lisinopril Swelling    Meperidine Nausea And Vomiting     Lightheadness       REVIEW OF SYSTEMS:    CONSTITUTIONAL: Negative for fevers and chills  HEENT: Negative for oropharyngeal exudate, post nasal drip, sinus pain / pressure, nasal congestion, ear pain  RESPIRATORY:  See HPI  CARDIOVASCULAR: Negative for chest pain, palpitations, edema  GASTROINTESTINAL: Negative for nausea, vomiting, diarrhea, constipation and abdominal pain  HEMATOLOGICAL: Negative for adenopathy  SKIN: Negative for clubbing, cyanosis, skin lesions  EXTREMITIES: Negative for weakness, decreased ROM  NEUROLOGICAL: Negative for unilateral weakness, speech or gait abnormalities  PSYCH: Negative for anxiety, depression    Objective:   PHYSICAL EXAM:        VITALS:  /74 (Site: Right Upper Arm, Position: Sitting, Cuff Size: Medium Adult)   Pulse 66   Temp 96.5 °F (35.8 °C) (Infrared)   Resp 16   Ht 5' 3\" (1.6 m)   Wt 155 lb (70.3 kg)   SpO2 97%   Breastfeeding No   BMI 27.46 kg/m²     CONSTITUTIONAL:  Awake, alert, cooperative, no apparent distress, and appears stated age  HEENT: No oropharyngeal exudate, PERRL, no cervical adenopathy, no tracheal deviation, thyroid size normal  LUNGS:  No increased work of breathing and clear to auscultation, no crackles or wheezing   CARDIOVASCULAR:  normal S1 and S2 and no JVD  ABDOMEN:  Normal bowel sounds, non-distended and non-tender to palpation  EXT: No edema, no calf tenderness. Pulses are present bilaterally. NEUROLOGIC:  Mental Status Exam:  Level of Alertness:   awake  Orientation:   person, place, time. SKIN:  normal skin color, texture, turgor, no redness, warmth, or swelling     DATA:      Radiology Review:  Pertinent images / reports were reviewed as a part of this visit. CT chest reveals the following: August 2021  Impression   1. A few groundglass nodules in the bilateral upper and left lower lobes   measure up to 1.2 cm x 0.9 cm, although most are 0.5 cm or less.  Recommend   follow-up chest CT in 3-6 months as below unless there are prior outside   studies available for comparison. 2. 0.3 cm solid nodules in the subpleural left upper lobe are more likely   benign sequelae of an infectious or inflammatory process or intrapulmonary   lymph nodes as opposed to neoplastic.  Consider optional follow-up chest CT   in 1 year as below unless there are prior outside studies to establish at   least 1 year of stability. 3. Minimal subpleural, basilar predominant interstitial fibrotic changes of   indeterminate pattern.    4. A few additional incidental findings as above for which no dedicated   follow-up is recommended.       RECOMMENDATIONS:   Multiple pulmonary nodules. Most severe: 11 mm ground-glass pulmonary nodule   within the upper lobe. Recommend a non-contrast Chest CT at 3-6 months. Subsequent management based on the most suspicious nodule(s). Last PFTs: None on file    Immunizations:   Immunization History   Administered Date(s) Administered    COVID-19, Moderna, Booster, PF, 50mcg/0.25ml 10/28/2021    COVID-19, Moderna, Primary or Immunocompromised, PF, 100mcg/0.5mL 01/19/2021, 02/17/2021    Hepatitis A Ped/Adol (Havrix, Vaqta) 11/20/2009, 05/25/2010    Influenza Virus Vaccine 10/01/2019    Influenza, High Dose (Fluzone 65 yrs and older) 09/08/2020    Influenza, High-dose, Quadv, 65 yrs +, IM (Fluzone) 09/13/2021    Pneumococcal Conjugate 13-valent ([id number]) 11/23/2014    Pneumococcal Polysaccharide (Dbrtrnoti10) 02/01/2005, 12/19/2012    Td (Adult), 5 Lf Tetanus Toxoid, Pf (Tenivac, Decavac) 11/01/2009    Tdap (Boostrix, Adacel) 07/31/2020    Typhoid Vi capsular polysaccharide (Typhim VI) 11/02/2009    Yellow Fever (YF-Vax) 11/20/2009    Zoster Live (Zostavax) 04/01/2007    Zoster Recombinant (Shingrix) 08/01/2018, 10/03/2018, 10/24/2018       Assessment: This is a 80 y.o. female with incidentally identified groundglass pulmonary nodules    Plan:   -CT scan showed a stable to possibly slightly decreased size groundglass nodule in her left upper lobe. I could not appreciate any substantial difference 1 where the other but it had not gone away as hoped. Repeat scan in 6 months which will be a 1 year follow-up from the original in August.  If it is stable in August then we will follow annually thereafter. Because of groundglass the duration of follow-up is unclear but it will be at least 5 years. There is a chance this could be adenocarcinoma in situ with lipidic growth. Diagnosis Orders   1.  Pulmonary nodule  CT CHEST WO CONTRAST      - Tobacco use: The patient is not currently smoking.        - RTC 6 months w/ MD. Call or RTC sooner if symptoms persist or worsen acutely.

## 2022-04-03 DIAGNOSIS — E78.2 MIXED HYPERLIPIDEMIA: ICD-10-CM

## 2022-04-04 DIAGNOSIS — R30.0 DYSURIA: ICD-10-CM

## 2022-04-04 RX ORDER — ATORVASTATIN CALCIUM 40 MG/1
40 TABLET, FILM COATED ORAL DAILY
Qty: 90 TABLET | Refills: 1 | Status: SHIPPED | OUTPATIENT
Start: 2022-04-04

## 2022-04-04 RX ORDER — SOLIFENACIN SUCCINATE 5 MG/1
5 TABLET, FILM COATED ORAL DAILY
Qty: 30 TABLET | Refills: 3 | Status: SHIPPED | OUTPATIENT
Start: 2022-04-04 | End: 2022-04-04

## 2022-04-04 RX ORDER — SOLIFENACIN SUCCINATE 5 MG/1
5 TABLET, FILM COATED ORAL DAILY
Qty: 90 TABLET | Refills: 1 | Status: SHIPPED | OUTPATIENT
Start: 2022-04-04 | End: 2022-07-14 | Stop reason: ALTCHOICE

## 2022-04-05 DIAGNOSIS — N39.0 ACUTE UTI: Primary | ICD-10-CM

## 2022-04-05 LAB
BILIRUBIN URINE: NEGATIVE
BLOOD, URINE: ABNORMAL
CLARITY: CLEAR
COLOR: YELLOW
EPITHELIAL CELLS, UA: 11 /HPF (ref 0–5)
GLUCOSE URINE: NEGATIVE MG/DL
HYALINE CASTS: 0 /LPF (ref 0–8)
KETONES, URINE: NEGATIVE MG/DL
LEUKOCYTE ESTERASE, URINE: ABNORMAL
MICROSCOPIC EXAMINATION: YES
NITRITE, URINE: NEGATIVE
PH UA: 6 (ref 5–8)
PROTEIN UA: NEGATIVE MG/DL
RBC UA: 1 /HPF (ref 0–4)
RENAL EPITHELIAL, UA: ABNORMAL /HPF (ref 0–1)
SPECIFIC GRAVITY UA: <=1.005 (ref 1–1.03)
URINE TYPE: ABNORMAL
UROBILINOGEN, URINE: 0.2 E.U./DL
WBC UA: 27 /HPF (ref 0–5)

## 2022-04-05 RX ORDER — CEFUROXIME AXETIL 250 MG/1
250 TABLET ORAL 2 TIMES DAILY
Qty: 14 TABLET | Refills: 0 | Status: SHIPPED | OUTPATIENT
Start: 2022-04-05 | End: 2022-04-12

## 2022-04-06 LAB — URINE CULTURE, ROUTINE: NORMAL

## 2022-05-04 ENCOUNTER — TELEPHONE (OUTPATIENT)
Dept: INTERNAL MEDICINE CLINIC | Age: 82
End: 2022-05-04

## 2022-05-04 NOTE — TELEPHONE ENCOUNTER
CHERRY. Patient arrived 15 minutes late for her appointment scheduled today 5/4/22 @ 10am.  Patient was advised that the appointment would need to be rescheduled. Patient insisted that I speak to Dr. Claudette Saint and ask that she be seen. Per Dr. Claudette Saint: Patient will need to be rescheduled. When I once again explained this to patient she became upset and stated she would not reschedule and that she may have to find a new Provider.

## 2022-06-16 ENCOUNTER — HOSPITAL ENCOUNTER (OUTPATIENT)
Dept: MAMMOGRAPHY | Age: 82
Discharge: HOME | End: 2022-06-16
Payer: MEDICARE

## 2022-06-16 VITALS — WEIGHT: 150 LBS | BODY MASS INDEX: 26.58 KG/M2 | HEIGHT: 63 IN

## 2022-06-16 DIAGNOSIS — Z12.31 VISIT FOR SCREENING MAMMOGRAM: ICD-10-CM

## 2022-06-16 PROCEDURE — 77063 BREAST TOMOSYNTHESIS BI: CPT

## 2022-07-14 RX ORDER — HYDROCHLOROTHIAZIDE 12.5 MG/1
12.5 CAPSULE, GELATIN COATED ORAL DAILY PRN
COMMUNITY

## 2022-07-14 RX ORDER — LANOLIN ALCOHOL/MO/W.PET/CERES
1000 CREAM (GRAM) TOPICAL DAILY
COMMUNITY

## 2022-07-14 RX ORDER — VITAMIN B COMPLEX
300 TABLET ORAL DAILY
COMMUNITY

## 2022-07-14 NOTE — PROGRESS NOTES
[address] [facility] time______1100______        Surgery time_____1230_______    Take the following medications with a sip of water: Follow your MD/Surgeons pre-procedure instructions regarding your medications     Do not eat or drink anything after 12:00 midnight prior to your surgery. This includes water chewing gum, mints and ice chips. You may brush your teeth and gargle the morning of your surgery, but do not swallow the water     Please see your family doctor/pediatrician for a history and physical and/or concerning medications. Bring any test results/reports from your physicians office. If you are under the care of a heart doctor or specialist doctor, please be aware that you may be asked to them for clearance    You may be asked to stop blood thinners such as Coumadin, Plavix, Fragmin, Lovenox, etc., or any anti-inflammatories such as:  Aspirin, Ibuprofen, Advil, Naproxen prior to your surgery. We also ask that you stop any OTC medications such as fish oil, vitamin E, glucosamine, garlic, Multivitamins, COQ 10, etc.    We ask that you do not smoke 24 hours prior to surgery  We ask that you do not  drink any alcoholic beverages 24 hours prior to surgery     You must make arrangements for a responsible adult to take you home after your surgery. For your safety you will not be allowed to leave alone or drive yourself home. Your surgery will be cancelled if you do not have a ride home. Also for your safety, it is strongly suggested that someone stay with you the first 24 hours after your surgery. A parent or legal guardian must accompany a child scheduled for surgery and plan to stay at the hospital until the child is discharged. Please do not bring other children with you. For your comfort, please wear simple loose fitting clothing to the hospital.  Please do not bring valuables.     Do not wear any make-up or nail polish on your fingers or toes      For your safety, please do not wear any jewelry or body piercing's on the day of surgery. All jewelry must be removed. If you have dentures, they will be removed before going to operating room. For your convenience, we will provide you with a container. If you wear contact lenses or glasses, they will be removed, please bring a case for them. If you have a living will and a durable power of  for healthcare, please bring in a copy. As part of our patient safety program to minimize surgical site infections, we ask you to do the following:    · Please notify your surgeon if you develop any illness between         now and the  day of your surgery. · This includes a cough, cold, fever, sore throat, nausea,         or vomiting, and diarrhea, etc.  ·  Please notify your surgeon if you experience dizziness, shortness         of breath or blurred vision between now and the time of your surgery. Do not shave your operative site 96 hours prior to surgery. For face and neck surgery, men may use an electric razor 48 hours   prior to surgery. You may shower the night before surgery or the morning of   your surgery with an antibacterial soap. You will need to bring a photo ID and insurance card    [facility] has an onsite pharmacy, would you like to utilize our pharmacy     If you will be staying overnight and use a C-pap machine, please bring   your C-pap to hospital     Our goal is to provide you with excellent care, therefore, visitors will be limited to two(2) in the room at a time so that we may focus on providing this care for you. Please contact pre-admission testing if you have any further questions. [facility] phone number:  [phone] [facility] fax number:  [phone]  Please note these are generalized instructions for all surgical cases, you may be provided with more specific instructions according to your surgery.     C-Difficile admission screening and protocol:       * Admitted with diarrhea? [] YES    [x]  NO     *Prior history of C-Diff. In last 3 months? [] YES    [x]  NO     *Antibiotic use in the past 6-8 weeks? [x]  NO    []  YES                 If yes, which ANTIBIOTIC AND REASON______     *Prior hospitalization or nursing home in the last month? []  YES    [x]  NO        SAFETY FIRST. .call before you fall

## 2022-07-18 ENCOUNTER — ANESTHESIA EVENT (OUTPATIENT)
Dept: OPERATING ROOM | Age: 82
End: 2022-07-18
Payer: MEDICARE

## 2022-07-21 ENCOUNTER — HOSPITAL ENCOUNTER (OUTPATIENT)
Age: 82
Setting detail: OUTPATIENT SURGERY
Discharge: HOME | End: 2022-07-21
Attending: UROLOGY | Admitting: UROLOGY
Payer: MEDICARE

## 2022-07-21 ENCOUNTER — ANESTHESIA (OUTPATIENT)
Dept: OPERATING ROOM | Age: 82
End: 2022-07-21
Payer: MEDICARE

## 2022-07-21 VITALS
OXYGEN SATURATION: 96 % | WEIGHT: 150 LBS | RESPIRATION RATE: 16 BRPM | DIASTOLIC BLOOD PRESSURE: 66 MMHG | HEIGHT: 63 IN | HEART RATE: 53 BPM | BODY MASS INDEX: 26.58 KG/M2 | SYSTOLIC BLOOD PRESSURE: 128 MMHG | TEMPERATURE: 98.1 F

## 2022-07-21 PROCEDURE — 3700000001 HC ADD 15 MINUTES (ANESTHESIA): Performed by: UROLOGY

## 2022-07-21 PROCEDURE — 2500000003 HC RX 250 WO HCPCS: Performed by: NURSE ANESTHETIST, CERTIFIED REGISTERED

## 2022-07-21 PROCEDURE — 2709999900 HC NON-CHARGEABLE SUPPLY: Performed by: UROLOGY

## 2022-07-21 PROCEDURE — 3600000012 HC SURGERY LEVEL 2 ADDTL 15MIN: Performed by: UROLOGY

## 2022-07-21 PROCEDURE — 7100000001 HC PACU RECOVERY - ADDTL 15 MIN: Performed by: UROLOGY

## 2022-07-21 PROCEDURE — 2580000003 HC RX 258: Performed by: ANESTHESIOLOGY

## 2022-07-21 PROCEDURE — 6360000002 HC RX W HCPCS: Performed by: NURSE ANESTHETIST, CERTIFIED REGISTERED

## 2022-07-21 PROCEDURE — L8606 SYNTHETIC IMPLNT URINARY 1ML: HCPCS | Performed by: UROLOGY

## 2022-07-21 PROCEDURE — 6360000002 HC RX W HCPCS: Performed by: UROLOGY

## 2022-07-21 PROCEDURE — 7100000011 HC PHASE II RECOVERY - ADDTL 15 MIN: Performed by: UROLOGY

## 2022-07-21 PROCEDURE — 3600000002 HC SURGERY LEVEL 2 BASE: Performed by: UROLOGY

## 2022-07-21 PROCEDURE — 2580000003 HC RX 258: Performed by: UROLOGY

## 2022-07-21 PROCEDURE — 3700000000 HC ANESTHESIA ATTENDED CARE: Performed by: UROLOGY

## 2022-07-21 PROCEDURE — 7100000010 HC PHASE II RECOVERY - FIRST 15 MIN: Performed by: UROLOGY

## 2022-07-21 PROCEDURE — 7100000000 HC PACU RECOVERY - FIRST 15 MIN: Performed by: UROLOGY

## 2022-07-21 DEVICE — BULKAMID URETHRAL BULKING SYSTEM 2ML
Type: IMPLANTABLE DEVICE | Site: URETHRA | Status: FUNCTIONAL
Brand: BULKAMID

## 2022-07-21 RX ORDER — MAGNESIUM HYDROXIDE 1200 MG/15ML
LIQUID ORAL
Status: COMPLETED | OUTPATIENT
Start: 2022-07-21 | End: 2022-07-21

## 2022-07-21 RX ORDER — SODIUM CHLORIDE 0.9 % (FLUSH) 0.9 %
5-40 SYRINGE (ML) INJECTION EVERY 12 HOURS SCHEDULED
Status: DISCONTINUED | OUTPATIENT
Start: 2022-07-21 | End: 2022-07-21 | Stop reason: HOSPADM

## 2022-07-21 RX ORDER — SODIUM CHLORIDE 0.9 % (FLUSH) 0.9 %
5-40 SYRINGE (ML) INJECTION PRN
Status: CANCELLED | OUTPATIENT
Start: 2022-07-21

## 2022-07-21 RX ORDER — LIDOCAINE HYDROCHLORIDE 20 MG/ML
INJECTION, SOLUTION INFILTRATION; PERINEURAL PRN
Status: DISCONTINUED | OUTPATIENT
Start: 2022-07-21 | End: 2022-07-21 | Stop reason: SDUPTHER

## 2022-07-21 RX ORDER — SODIUM CHLORIDE 9 MG/ML
INJECTION, SOLUTION INTRAVENOUS PRN
Status: CANCELLED | OUTPATIENT
Start: 2022-07-21

## 2022-07-21 RX ORDER — LORAZEPAM 2 MG/ML
0.5 INJECTION INTRAMUSCULAR
Status: CANCELLED | OUTPATIENT
Start: 2022-07-21 | End: 2022-07-21

## 2022-07-21 RX ORDER — HALOPERIDOL 5 MG/ML
1 INJECTION INTRAMUSCULAR
Status: CANCELLED | OUTPATIENT
Start: 2022-07-21 | End: 2022-07-21

## 2022-07-21 RX ORDER — SODIUM CHLORIDE 9 MG/ML
INJECTION, SOLUTION INTRAVENOUS CONTINUOUS
Status: DISCONTINUED | OUTPATIENT
Start: 2022-07-21 | End: 2022-07-21 | Stop reason: HOSPADM

## 2022-07-21 RX ORDER — SODIUM CHLORIDE 0.9 % (FLUSH) 0.9 %
5-40 SYRINGE (ML) INJECTION EVERY 12 HOURS SCHEDULED
Status: CANCELLED | OUTPATIENT
Start: 2022-07-21

## 2022-07-21 RX ORDER — SODIUM CHLORIDE 0.9 % (FLUSH) 0.9 %
5-40 SYRINGE (ML) INJECTION PRN
Status: DISCONTINUED | OUTPATIENT
Start: 2022-07-21 | End: 2022-07-21 | Stop reason: HOSPADM

## 2022-07-21 RX ORDER — ONDANSETRON 2 MG/ML
4 INJECTION INTRAMUSCULAR; INTRAVENOUS
Status: CANCELLED | OUTPATIENT
Start: 2022-07-21 | End: 2022-07-21

## 2022-07-21 RX ORDER — PROPOFOL 10 MG/ML
INJECTION, EMULSION INTRAVENOUS CONTINUOUS PRN
Status: DISCONTINUED | OUTPATIENT
Start: 2022-07-21 | End: 2022-07-21 | Stop reason: SDUPTHER

## 2022-07-21 RX ORDER — SODIUM CHLORIDE 9 MG/ML
INJECTION, SOLUTION INTRAVENOUS PRN
Status: DISCONTINUED | OUTPATIENT
Start: 2022-07-21 | End: 2022-07-21 | Stop reason: HOSPADM

## 2022-07-21 RX ORDER — FENTANYL CITRATE 50 UG/ML
50 INJECTION, SOLUTION INTRAMUSCULAR; INTRAVENOUS EVERY 5 MIN PRN
Status: CANCELLED | OUTPATIENT
Start: 2022-07-21

## 2022-07-21 RX ORDER — OXYCODONE HYDROCHLORIDE 5 MG/1
5 TABLET ORAL
Status: CANCELLED | OUTPATIENT
Start: 2022-07-21 | End: 2022-07-21

## 2022-07-21 RX ORDER — DIPHENHYDRAMINE HYDROCHLORIDE 50 MG/ML
12.5 INJECTION INTRAMUSCULAR; INTRAVENOUS
Status: CANCELLED | OUTPATIENT
Start: 2022-07-21 | End: 2022-07-21

## 2022-07-21 RX ADMIN — SODIUM CHLORIDE: 9 INJECTION, SOLUTION INTRAVENOUS at 12:35

## 2022-07-21 RX ADMIN — LIDOCAINE HYDROCHLORIDE 50 MG: 20 INJECTION, SOLUTION INFILTRATION; PERINEURAL at 12:41

## 2022-07-21 RX ADMIN — PROPOFOL 140 MCG/KG/MIN: 10 INJECTION, EMULSION INTRAVENOUS at 12:41

## 2022-07-21 RX ADMIN — CEFTRIAXONE 2000 MG: 1 INJECTION, POWDER, FOR SOLUTION INTRAMUSCULAR; INTRAVENOUS at 12:35

## 2022-07-21 ASSESSMENT — PAIN DESCRIPTION - PAIN TYPE: TYPE: SURGICAL PAIN

## 2022-07-21 ASSESSMENT — PAIN DESCRIPTION - DESCRIPTORS
DESCRIPTORS: ACHING
DESCRIPTORS: BURNING

## 2022-07-21 ASSESSMENT — PAIN - FUNCTIONAL ASSESSMENT
PAIN_FUNCTIONAL_ASSESSMENT: ACTIVITIES ARE NOT PREVENTED
PAIN_FUNCTIONAL_ASSESSMENT: 0-10
PAIN_FUNCTIONAL_ASSESSMENT: ACTIVITIES ARE NOT PREVENTED

## 2022-07-21 ASSESSMENT — PAIN DESCRIPTION - FREQUENCY: FREQUENCY: CONTINUOUS

## 2022-07-21 ASSESSMENT — PAIN SCALES - GENERAL: PAINLEVEL_OUTOF10: 3

## 2022-07-21 ASSESSMENT — LIFESTYLE VARIABLES: SMOKING_STATUS: 0

## 2022-07-21 ASSESSMENT — PAIN DESCRIPTION - ONSET: ONSET: ON-GOING

## 2022-07-21 ASSESSMENT — PAIN DESCRIPTION - LOCATION: LOCATION: VAGINA

## 2022-07-21 NOTE — ANESTHESIA PRE PROCEDURE
Department of Anesthesiology  Preprocedure Note       Name:  Gianna Wallace   Age:  80 y.o.  :  1940                                          MRN:  [id number]         Date:  2022      Surgeon: Sadiq Andersen):  Shakeel Mendoza MD    Procedure: Procedure(s):  CYSTOSCOPY WITH INJECTION OF URETHRAL BULKING AGENT - BULKAMID    Medications prior to admission:   Prior to Admission medications    Medication Sig Start Date End Date Taking?  Authorizing Provider   vitamin B-12 (CYANOCOBALAMIN) 1000 MCG tablet Take 1,000 mcg by mouth daily   Yes Historical Provider, MD   hydroCHLOROthiazide (MICROZIDE) 12.5 MG capsule Take 12.5 mg by mouth daily as needed   Yes Historical Provider, MD   Coenzyme Q10 (COQ10) 100 MG CAPS Take 300 mg by mouth daily   Yes Historical Provider, MD   atorvastatin (LIPITOR) 40 MG tablet TAKE 1 TABLET BY MOUTH DAILY 22   Kandace Napier MD   fluticasone (FLONASE) 50 MCG/ACT nasal spray SHAKE LIQUID AND USE 2 SPRAYS IN EACH NOSTRIL DAILY  Patient taking differently: 2 sprays by Nasal route daily as needed  2/15/22   Kandace Napier MD   amLODIPine (NORVASC) 5 MG tablet TAKE 1 TABLET BY MOUTH DAILY 21   Kandace Napier MD   Cholecalciferol (VITAMIN D) 50 MCG (2000 UT) CAPS capsule Take 2,000 Units by mouth     Historical Provider, MD   Glucosamine-Chondroit-Vit C-Mn (GLUCOSAMINE 1500 COMPLEX) CAPS Take 1 tablet by mouth daily     Historical Provider, MD       Current medications:    Current Facility-Administered Medications   Medication Dose Route Frequency Provider Last Rate Last Admin    cefTRIAXone (ROCEPHIN) 1000 mg IVPB in 50 mL D5W minibag  1,000 mg IntraVENous Once Shakeel Mendoza MD        0.9 % sodium chloride infusion   IntraVENous Continuous Tomeka Benítez MD        sodium chloride flush 0.9 % injection 5-40 mL  5-40 mL IntraVENous 2 times per day Tomeka Benítez MD        sodium chloride flush 0.9 % injection 5-40 mL  5-40 mL IntraVENous PRN Tomeka Benítez MD        0.9 % sodium chloride infusion   IntraVENous PRN Marla Markham MD           Allergies:     Allergies   Allergen Reactions    Atenolol Other (See Comments)     Avoid beta blockers; sinus atrial node dysfunction Dr Jahaira Worthington 12/2013    Lisinopril Swelling     Facial/lips swelling      Sulfa Antibiotics Nausea And Vomiting and Dizziness or Vertigo    Meperidine Nausea And Vomiting and Dizziness or Vertigo       Problem List:    Patient Active Problem List   Diagnosis Code    Essential hypertension I10    Elevated LFTs R79.89    Family history of heart disease in male family member before age 54 Z80.55   [facility] Family history of sudden death Z80.80   [facility] History of deep vein thrombosis of lower extremity Z86.718    Mixed hyperlipidemia E78.2    Non-rheumatic mitral regurgitation I34.0    OAB (overactive bladder) N32.81    Osteopenia of multiple sites M85.89    Status post total left knee replacement Z96.652    Left ovarian cyst N83.202    Carcinoma, renal cell, right (HCC) C64.1    History of thyroid nodule Z86.39    Varicose veins of leg with swelling, right I83.891    Rosacea L71.9    Urge incontinence N39.41       Past Medical History:        Diagnosis Date    Arthritis     H/O hiatal hernia     H/O seasonal allergies     History of diverticulitis     History of therapeutic radiation     Hx of blood clots 2017    after left TKR    Hyperlipidemia     Hypertension     PONV (postoperative nausea and vomiting)     Risk for falls     Stress incontinence     Wears glasses     reading       Past Surgical History:        Procedure Laterality Date    BLADDER SUSPENSION      BREAST BIOPSY Left     cyst    CHOLECYSTECTOMY      CYSTOSCOPY      EYE SURGERY Bilateral     cataract surgery    HYSTERECTOMY (CERVIX STATUS UNKNOWN)      INCONTINENCE SURGERY      JOINT REPLACEMENT Left 2018    lef total knee replacement       Social History:    Social History     Tobacco Use    Smoking status: Former     Packs/day: 0.25     Years: 15.00     Pack years: 3.75     Types: Cigarettes     Quit date: 1980     Years since quittin.5    Smokeless tobacco: Former    Tobacco comments:     stop smoking    Substance Use Topics    Alcohol use: Yes     Alcohol/week: 1.0 standard drink     Types: 1 Glasses of wine per week     Comment: rarely                                Counseling given: Not Answered  Tobacco comments: stop smoking       Vital Signs (Current):   Vitals:    22 1216 22 1134   BP:  (!) 143/69   Pulse:  61   Resp:  18   Temp:  96.8 °F (36 °C)   TempSrc:  Temporal   SpO2:  97%   Weight: 150 lb (68 kg) 150 lb (68 kg)   Height: 5' 3\" (1.6 m) 5' 3\" (1.6 m)                                              BP Readings from Last 3 Encounters:   22 (!) 143/69   22 138/74   02/15/22 138/68       NPO Status: Time of last liquid consumption: 100                        Time of last solid consumption:                         Date of last liquid consumption: 22                        Date of last solid food consumption: 22    BMI:   Wt Readings from Last 3 Encounters:   22 150 lb (68 kg)   22 150 lb (68 kg)   22 155 lb (70.3 kg)     Body mass index is 26.57 kg/m².     CBC:   Lab Results   Component Value Date/Time    WBC 6.2 02/15/2022 11:05 AM    RBC 5.07 02/15/2022 11:05 AM    HGB 14.9 02/15/2022 11:05 AM    HCT 44.2 02/15/2022 11:05 AM    MCV 87.3 02/15/2022 11:05 AM    RDW 13.7 02/15/2022 11:05 AM     02/15/2022 11:05 AM       CMP:   Lab Results   Component Value Date/Time     02/15/2022 11:05 AM    K 4.4 02/15/2022 11:05 AM     02/15/2022 11:05 AM    CO2 25 02/15/2022 11:05 AM    BUN 16 02/15/2022 11:05 AM    CREATININE 0.5 02/15/2022 11:05 AM    GFRAA >60 02/15/2022 11:05 AM    GFRAA >60 2011 11:16 AM    AGRATIO 1.7 02/15/2022 11:05 AM    LABGLOM >60 02/15/2022 11:05 AM    GLUCOSE 99 02/15/2022 11:05 AM    PROT 7.0 02/15/2022 11:05 AM    PROT 7.4 01/05/2011 11:16 AM    CALCIUM 9.2 02/15/2022 11:05 AM    BILITOT 0.8 02/15/2022 11:05 AM    ALKPHOS 84 02/15/2022 11:05 AM    AST 28 02/15/2022 11:05 AM    ALT 25 02/15/2022 11:05 AM       POC Tests: No results for input(s): POCGLU, POCNA, POCK, POCCL, POCBUN, POCHEMO, POCHCT in the last 72 hours. Coags: No results found for: PROTIME, INR, APTT    HCG (If Applicable): No results found for: PREGTESTUR, PREGSERUM, HCG, HCGQUANT     ABGs: No results found for: PHART, PO2ART, YCH8LGL, XUE4GCZ, BEART, W1JCHMOL     Type & Screen (If Applicable):  No results found for: LABABO, LABRH    Drug/Infectious Status (If Applicable):  No results found for: HIV, HEPCAB    COVID-19 Screening (If Applicable):   Lab Results   Component Value Date/Time    COVID19 Not Detected 12/16/2021 10:58 AM           Anesthesia Evaluation  Patient summary reviewed   history of anesthetic complications: PONV. Airway: Mallampati: III  TM distance: >3 FB   Neck ROM: full  Mouth opening: > = 3 FB   Dental:    (+) implants      Pulmonary:       (-) not a current smoker                           Cardiovascular:    (+) hypertension:, valvular problems/murmurs: MVP, hyperlipidemia    (-) past MI, CABG/stent and  angina                Neuro/Psych:   Negative Neuro/Psych ROS     (-) seizures and CVA           GI/Hepatic/Renal:   (+) liver disease (Renal cell carcinoma history):,           Endo/Other:        (-) diabetes mellitus               Abdominal:             Vascular: negative vascular ROS. Other Findings:           Anesthesia Plan      MAC     ASA 3       Induction: intravenous. Anesthetic plan and risks discussed with patient. Plan discussed with CRNA. This pre-anesthesia assessment may be used as a history and physical.    DOS STAFF ADDENDUM:    Pt seen and examined, chart reviewed (including anesthesia, drug and allergy history).   No interval changes to history and physical examination. Anesthetic plan, risks, benefits, alternatives, and personnel involved discussed with patient. Patient verbalized an understanding and agrees to proceed.       Lizzie Skinner MD  July 21, 2022  12:19 PM

## 2022-07-21 NOTE — DISCHARGE INSTRUCTIONS
Please make sure the patient is able to urinate prior to discharge home. No diet or activity restrictions from urology standpoint. A small amount of blood in the urine or burning with urination is expected.     Call for inability to urinate    Follow up in the office in 2 weeks

## 2022-07-21 NOTE — ANESTHESIA POSTPROCEDURE EVALUATION
Department of Anesthesiology  Postprocedure Note    Patient: Danielle Lei  MRN: [id number]  YOB: 1940  Date of evaluation: 7/21/2022      Procedure Summary     Date: 07/21/22 Room / Location: [address] [place] [address]    Anesthesia Start: 6054 Anesthesia Stop: 9450    Procedure: CYSTOSCOPY WITH INJECTION OF URETHRAL BULKING AGENT - BULKAMID (Urethra) Diagnosis:       Stress incontinence, female      (STRESS INCONTINENCE (FEMALE))    Surgeons: Sonali Flores MD Responsible Provider: Fortino Ma MD    Anesthesia Type: MAC ASA Status: 3          Anesthesia Type: No value filed.     Maurisio Phase I: Maurisio Score: 10    Maurisio Phase II: Maurisio Score: 10      Anesthesia Post Evaluation    Patient location during evaluation: PACU  Patient participation: complete - patient participated  Level of consciousness: awake and alert  Pain score: 0  Nausea & Vomiting: no nausea  Complications: no  Cardiovascular status: hemodynamically stable  Respiratory status: acceptable  Hydration status: stable

## 2022-07-21 NOTE — BRIEF OP NOTE
Brief Postoperative Note      Patient: Era Morejon  YOB: 1940  MRN: [id number]    Date of Procedure: 7/21/2022    Pre-Op Diagnosis: STRESS INCONTINENCE (FEMALE)    Post-Op Diagnosis: Same       Procedure(s):  CYSTOSCOPY WITH INJECTION OF URETHRAL BULKING AGENT - BULKAMID    Surgeon(s):  Tanya Burns MD    Assistant:  * No surgical staff found *    Anesthesia: Monitor Anesthesia Care    Estimated Blood Loss (mL): Minimal    Complications: None    Specimens:   * No specimens in log *    Implants:  Implant Name Type Inv.  Item Serial No.  Lot No. LRB No. Used Action   SYSTEM BULKING PROC BULKAMID URETHRAL - [id number]  SYSTEM BULKING PROC BULKAMID URETHRAL  [email or web address] [id number] N/A 1 Implanted         Drains: * No LDAs found *    Findings: normal cysto    Electronically signed by Tanya Burns MD on 7/21/2022 at 1:02 PM

## 2022-07-22 NOTE — OP NOTE
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 PROCEDURE PERFORMED:  Cystoscopy, injection of urethral bulking agent  using Bulkamid. SURGEON:  Latisha Tracy MD    ANESTHESIA:  TIVA. COMPLICATIONS:  None. BLOOD LOSS:  Minimal.    INDICATIONS:  An 40-year-old female with a complex urologic history. She has had multiple previous procedures for incontinence. She has  persistent incontinence that has not been responsive to medical  management. She had urodynamics in 2022 which showed stress  incontinence. She is here for urethral bulking injection. PROCEDURE:  She was given Rocephin. She was taken to the cystoscopy  suite. She moved onto the table. Anesthesia was induced. Her position  was changed to the lithotomy position. Her genitalia were prepped and  draped. The 21-Finnish cystoscope advanced easily through the urethra  into the bladder. The bladder showed no abnormalities. Both ureteral  orifices were normal.  The urethra was also normal.  The bladder was  drained and the scope was withdrawn. The Bulkamid scope was then  inserted. I withdrew the scope to the level of the mid urethra.   The  Bulkamid was then injected submucosally, 0.5 mL was injected at the 7  o'clock position followed by 0.5 mL at the 5 o'clock position and 0.5 mL  at the 1 o'clock position and 0.5 mL at the 11 o'clock position. This  resulted in excellent coaptation. The scope was withdrawn. A 12-Indonesian  catheter was used to drain the bladder. The patient was then awakened  and transferred to recovery. She will have a voiding trial in the  recovery room, and she will follow up in the office in 2 weeks.         Mee Galeano MD    D: 07/21/2022 13:17:22       T: 07/21/2022 13:21:03     [email or web address]  Job#: [id number]     Doc#: [id number]    CC:

## 2022-08-29 ENCOUNTER — HOSPITAL ENCOUNTER (OUTPATIENT)
Dept: CT IMAGING | Age: 82
Discharge: HOME | End: 2022-08-29
Payer: MEDICARE

## 2022-08-29 DIAGNOSIS — R91.1 PULMONARY NODULE: ICD-10-CM

## 2022-08-29 PROCEDURE — 71250 CT THORAX DX C-: CPT

## 2022-09-08 ENCOUNTER — OFFICE VISIT (OUTPATIENT)
Dept: PULMONOLOGY | Age: 82
End: 2022-09-08
Payer: MEDICARE

## 2022-09-08 VITALS
TEMPERATURE: 97 F | WEIGHT: 153 LBS | DIASTOLIC BLOOD PRESSURE: 69 MMHG | SYSTOLIC BLOOD PRESSURE: 131 MMHG | BODY MASS INDEX: 27.11 KG/M2 | HEIGHT: 63 IN | HEART RATE: 60 BPM | OXYGEN SATURATION: 99 %

## 2022-09-08 DIAGNOSIS — R91.1 PULMONARY NODULE: ICD-10-CM

## 2022-09-08 DIAGNOSIS — R91.8 PULMONARY NODULES/LESIONS, MULTIPLE: Primary | ICD-10-CM

## 2022-09-08 PROCEDURE — 1090F PRES/ABSN URINE INCON ASSESS: CPT | Performed by: INTERNAL MEDICINE

## 2022-09-08 PROCEDURE — G8417 CALC BMI ABV UP PARAM F/U: HCPCS | Performed by: INTERNAL MEDICINE

## 2022-09-08 PROCEDURE — G8427 DOCREV CUR MEDS BY ELIG CLIN: HCPCS | Performed by: INTERNAL MEDICINE

## 2022-09-08 PROCEDURE — 1123F ACP DISCUSS/DSCN MKR DOCD: CPT | Performed by: INTERNAL MEDICINE

## 2022-09-08 PROCEDURE — 1036F TOBACCO NON-USER: CPT | Performed by: INTERNAL MEDICINE

## 2022-09-08 PROCEDURE — 99212 OFFICE O/P EST SF 10 MIN: CPT | Performed by: INTERNAL MEDICINE

## 2022-09-08 PROCEDURE — G8399 PT W/DXA RESULTS DOCUMENT: HCPCS | Performed by: INTERNAL MEDICINE

## 2022-09-08 NOTE — PROGRESS NOTES
[facility] Pulmonary and Critical Care    Outpatient Initial Note    Subjective:   Referring Physician: Laureen Craig / HPI:     The patient is 80 y.o. female who presents today for a follow-up visit for abnormal chest CT. [facility] comes in today without any acute complaints. She is not having any respiratory issues and she got her follow-up CT last month. 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 She quit smoking 41 years ago. She has a density on her right kidney that has been followed for the past year and a half by the [email or web address]. It is concerning for malignancy but has not changed in a year and she has not had a biopsy. She was under the impression that she had cancer cells in her kidney but it does not sound like that was actually ever confirmed. It was found when she had some hematuria. On her last abdominal CT to follow-up this lesion there was a comment about some groundglass nodules in the base of her lungs. This was followed up by a dedicated CT of her chest. The CT of her chest showed multiple groundglass nodules most of which were subcentimeter. There was a dominant nodule in her left upper lobe that was 1.2 x 1.1 cm roughly. It was groundglass in nature as well. She is very active and recently got back from [place] where she went zip lining and white water rafting with her 13year-old grandson.  Apparently it is her tradition that she takes her grandsons on a international trip when they are 13years old       Past Medical History:    Past Medical History:   Diagnosis Date    Arthritis     H/O hiatal hernia     H/O seasonal allergies     History of diverticulitis     History of therapeutic radiation     Hx of blood clots 2017    after left TKR    Hyperlipidemia     Hypertension     PONV (postoperative nausea and vomiting)     Risk for falls Stress incontinence     Wears glasses     reading       Social History:    Social History     Tobacco Use   Smoking Status Former    Packs/day: 0.25    Years: 15.00    Pack years: 3.75    Types: Cigarettes    Quit date: 1980    Years since quittin.7   Smokeless Tobacco Former   Tobacco Comments    stop smoking        Family History:  Family History   Problem Relation Age of Onset    Deep Vein Thrombosis Mother 35    Stroke Brother         hemorrhagic    Breast Cancer Daughter 44     Current Medications:  Current Outpatient Medications on File Prior to Visit   Medication Sig Dispense Refill    vitamin B-12 (CYANOCOBALAMIN) 1000 MCG tablet Take 1,000 mcg by mouth daily      hydroCHLOROthiazide (MICROZIDE) 12.5 MG capsule Take 12.5 mg by mouth daily as needed      Coenzyme Q10 (COQ10) 100 MG CAPS Take 300 mg by mouth daily      atorvastatin (LIPITOR) 40 MG tablet TAKE 1 TABLET BY MOUTH DAILY 90 tablet 1    fluticasone (FLONASE) 50 MCG/ACT nasal spray SHAKE LIQUID AND USE 2 SPRAYS IN EACH NOSTRIL DAILY 48 g 1    amLODIPine (NORVASC) 5 MG tablet TAKE 1 TABLET BY MOUTH DAILY 90 tablet 1    Cholecalciferol (VITAMIN D) 50 MCG (2000 UT) CAPS capsule Take 2,000 Units by mouth       Glucosamine-Chondroit-Vit C-Mn (GLUCOSAMINE 1500 COMPLEX) CAPS Take 1 tablet by mouth daily        No current facility-administered medications on file prior to visit. Allergies:   Allergies   Allergen Reactions    Atenolol Other (See Comments)     Avoid beta blockers; sinus atrial node dysfunction Dr Yajaira Biggs 2013    Lisinopril Swelling     Facial/lips swelling      Sulfa Antibiotics Nausea And Vomiting and Dizziness or Vertigo    Meperidine Nausea And Vomiting and Dizziness or Vertigo       REVIEW OF SYSTEMS:    CONSTITUTIONAL: Negative for fevers and chills  HEENT: Negative for oropharyngeal exudate, post nasal drip, sinus pain / pressure, nasal congestion, ear pain  RESPIRATORY:  See HPI  CARDIOVASCULAR: Negative for chest pain, palpitations, edema  GASTROINTESTINAL: Negative for nausea, vomiting, diarrhea, constipation and abdominal pain  HEMATOLOGICAL: Negative for adenopathy  SKIN: Negative for clubbing, cyanosis, skin lesions  EXTREMITIES: Negative for weakness, decreased ROM  NEUROLOGICAL: Negative for unilateral weakness, speech or gait abnormalities  PSYCH: Negative for anxiety, depression    Objective:   PHYSICAL EXAM:        VITALS:  /69 (Site: Right Upper Arm, Position: Sitting, Cuff Size: Medium Adult)   Pulse 60   Temp 97 °F (36.1 °C) (Infrared)   Ht 5' 3\" (1.6 m)   Wt 153 lb (69.4 kg)   SpO2 99%   BMI 27.10 kg/m²     CONSTITUTIONAL:  Awake, alert, cooperative, no apparent distress, and appears stated age  HEENT: No oropharyngeal exudate, PERRL, no cervical adenopathy, no tracheal deviation, thyroid size normal  LUNGS:  No increased work of breathing and clear to auscultation, no crackles or wheezing   CARDIOVASCULAR:  normal S1 and S2 and no JVD  ABDOMEN:  Normal bowel sounds, non-distended and non-tender to palpation  EXT: No edema, no calf tenderness. Pulses are present bilaterally. NEUROLOGIC:  Mental Status Exam:  Level of Alertness:   awake  Orientation:   person, place, time. SKIN:  normal skin color, texture, turgor, no redness, warmth, or swelling     DATA:      Radiology Review:  Pertinent images / reports were reviewed as a part of this visit. CT chest reveals the following: August 2021  Impression   1. A few groundglass nodules in the bilateral upper and left lower lobes   measure up to 1.2 cm x 0.9 cm, although most are 0.5 cm or less. Recommend   follow-up chest CT in 3-6 months as below unless there are prior outside   studies available for comparison. 2. 0.3 cm solid nodules in the subpleural left upper lobe are more likely   benign sequelae of an infectious or inflammatory process or intrapulmonary   lymph nodes as opposed to neoplastic.   Consider optional follow-up chest CT   in 1 year as below unless there are prior outside studies to establish at   least 1 year of stability. 3. Minimal subpleural, basilar predominant interstitial fibrotic changes of   indeterminate pattern. 4. A few additional incidental findings as above for which no dedicated   follow-up is recommended. RECOMMENDATIONS:   Multiple pulmonary nodules. Most severe: 11 mm ground-glass pulmonary nodule   within the upper lobe. Recommend a non-contrast Chest CT at 3-6 months. Subsequent management based on the most suspicious nodule(s). 8/2022:  Impression       Stable groundglass nodule of the left upper lobe. Small hiatal hernia. Last PFTs: None on file    Immunizations:   Immunization History   Administered Date(s) Administered    COVID-19, MODERNA BLUE border, Primary or Immunocompromised, (age 12y+), IM, 100 mcg/0.5mL 01/19/2021, 02/17/2021, 04/19/2022    COVID-19, MODERNA Booster BLUE border, (age 18y+), IM, 50mcg/0.25mL 10/28/2021    Hepatitis A Ped/Adol (Havrix, Vaqta) 11/20/2009, 05/25/2010    Influenza Virus Vaccine 10/01/2019    Influenza, FLUZONE (age 72 y+), High Dose, 0.7mL 09/13/2021    Influenza, High Dose (Fluzone 65 yrs and older) 09/08/2020    Pneumococcal Conjugate 13-valent (Cmhlfjc50) 11/23/2014    Pneumococcal Polysaccharide (Kacfuteol33) 02/01/2005, 12/19/2012    Td (Adult), 5 Lf Tetanus Toxoid, Pf (Tenivac, Decavac) 11/01/2009    Tdap (Boostrix, Adacel) 07/31/2020    Typhoid Vi capsular polysaccharide (Typhim VI) 11/02/2009    Yellow Fever (YF-Vax) 11/20/2009    Zoster Live (Zostavax) 04/01/2007    Zoster Recombinant (Shingrix) 08/01/2018, 10/03/2018, 10/24/2018       Assessment: This is a 80 y.o. female with incidentally identified groundglass pulmonary nodules    Plan:   -CT scan showed a stable groundglass nodule in her left upper lobe. Because of groundglass the duration of follow-up is unclear but it will be at least 5 years.   There is a chance this could be adenocarcinoma in situ with lipidic growth. A repeat scan in 1 year is appropriate. If patient's overall health remains good and she remains active then continuing to pursue this in case it is a slow-growing malignancy makes sense. If other health issues deteriorate then treatment options if this does turn out to be malignancy become less beneficial.     Diagnosis Orders   1. Pulmonary nodules/lesions, multiple  CT CHEST WO CONTRAST      2. Pulmonary nodule  CT CHEST WO CONTRAST           - Tobacco use: The patient is not currently smoking.      - RTC 12 months w/ MD. Call or RTC sooner if symptoms persist or worsen acutely.

## 2023-08-25 ENCOUNTER — TELEPHONE (OUTPATIENT)
Dept: CASE MANAGEMENT | Age: 83
End: 2023-08-25

## 2023-08-25 NOTE — TELEPHONE ENCOUNTER
Pt due for 1 year f/u chest ct. Reminder letter mailed.      Tito TATEN, RN   Lung Nodule Navigator  [facility]  [phone]

## (undated) DEVICE — SOLUTION IV IRRIG WATER 1000ML POUR BRL 2F7114

## (undated) DEVICE — GOWN SIRUS NONREIN XL W/TWL: Brand: MEDLINE INDUSTRIES, INC.

## (undated) DEVICE — CYSTOSCOPY: Brand: MEDLINE INDUSTRIES, INC.

## (undated) DEVICE — SYRINGE MED 10ML TRNSLUC BRL PLUNG BLK MRK POLYPR CTRL

## (undated) DEVICE — SOLUTION IRRIG 3000ML STRL H2O USP UROMATIC PLAS CONT

## (undated) DEVICE — GLOVE SURG SZ 65 CRM LTX FREE POLYISOPRENE POLYMER BEAD ANTI

## (undated) DEVICE — CATHETER URETH 12FR L16IN RED RUB INTMIT ROB MOD BARDX